# Patient Record
Sex: FEMALE | Race: WHITE | NOT HISPANIC OR LATINO
[De-identification: names, ages, dates, MRNs, and addresses within clinical notes are randomized per-mention and may not be internally consistent; named-entity substitution may affect disease eponyms.]

---

## 2017-07-26 ENCOUNTER — APPOINTMENT (OUTPATIENT)
Dept: INTERNAL MEDICINE | Facility: CLINIC | Age: 67
End: 2017-07-26

## 2017-07-26 VITALS
BODY MASS INDEX: 19.75 KG/M2 | SYSTOLIC BLOOD PRESSURE: 127 MMHG | OXYGEN SATURATION: 99 % | RESPIRATION RATE: 12 BRPM | HEART RATE: 72 BPM | DIASTOLIC BLOOD PRESSURE: 60 MMHG | WEIGHT: 122.9 LBS | HEIGHT: 66 IN

## 2017-07-26 DIAGNOSIS — M06.9 RHEUMATOID ARTHRITIS, UNSPECIFIED: ICD-10-CM

## 2017-08-04 ENCOUNTER — APPOINTMENT (OUTPATIENT)
Dept: INTERNAL MEDICINE | Facility: CLINIC | Age: 67
End: 2017-08-04

## 2017-08-04 LAB
ANION GAP SERPL CALC-SCNC: 17 MMOL/L
BUN SERPL-MCNC: 10 MG/DL
CALCIUM SERPL-MCNC: 9.4 MG/DL
CHLORIDE SERPL-SCNC: 101 MMOL/L
CO2 SERPL-SCNC: 24 MMOL/L
CREAT SERPL-MCNC: 0.79 MG/DL
GLUCOSE SERPL-MCNC: 98 MG/DL
HBA1C MFR BLD HPLC: 5.3 %
POTASSIUM SERPL-SCNC: 4.6 MMOL/L
SODIUM SERPL-SCNC: 142 MMOL/L

## 2017-09-08 ENCOUNTER — APPOINTMENT (OUTPATIENT)
Dept: INTERNAL MEDICINE | Facility: CLINIC | Age: 67
End: 2017-09-08
Payer: MEDICARE

## 2017-09-08 VITALS
HEIGHT: 66 IN | TEMPERATURE: 97.8 F | OXYGEN SATURATION: 98 % | SYSTOLIC BLOOD PRESSURE: 123 MMHG | BODY MASS INDEX: 19.61 KG/M2 | DIASTOLIC BLOOD PRESSURE: 78 MMHG | WEIGHT: 122 LBS | HEART RATE: 76 BPM | RESPIRATION RATE: 14 BRPM

## 2017-09-08 DIAGNOSIS — J30.9 ALLERGIC RHINITIS, UNSPECIFIED: ICD-10-CM

## 2017-09-08 PROCEDURE — 99213 OFFICE O/P EST LOW 20 MIN: CPT

## 2017-09-09 ENCOUNTER — CLINICAL ADVICE (OUTPATIENT)
Age: 67
End: 2017-09-09

## 2017-09-16 ENCOUNTER — CHART COPY (OUTPATIENT)
Age: 67
End: 2017-09-16

## 2017-09-27 ENCOUNTER — APPOINTMENT (OUTPATIENT)
Dept: PULMONOLOGY | Facility: CLINIC | Age: 67
End: 2017-09-27
Payer: MEDICARE

## 2017-09-27 ENCOUNTER — FORM ENCOUNTER (OUTPATIENT)
Age: 67
End: 2017-09-27

## 2017-09-27 VITALS
BODY MASS INDEX: 18.96 KG/M2 | OXYGEN SATURATION: 97 % | WEIGHT: 118 LBS | HEART RATE: 72 BPM | HEIGHT: 66 IN | SYSTOLIC BLOOD PRESSURE: 110 MMHG | DIASTOLIC BLOOD PRESSURE: 70 MMHG

## 2017-09-27 PROCEDURE — 99204 OFFICE O/P NEW MOD 45 MIN: CPT

## 2017-09-28 ENCOUNTER — OUTPATIENT (OUTPATIENT)
Dept: OUTPATIENT SERVICES | Facility: HOSPITAL | Age: 67
LOS: 1 days | End: 2017-09-28
Payer: MEDICARE

## 2017-09-28 ENCOUNTER — APPOINTMENT (OUTPATIENT)
Dept: RADIOLOGY | Facility: HOSPITAL | Age: 67
End: 2017-09-28
Payer: MEDICARE

## 2017-09-28 PROCEDURE — 71020: CPT | Mod: 26

## 2017-09-28 PROCEDURE — 71046 X-RAY EXAM CHEST 2 VIEWS: CPT

## 2017-10-06 ENCOUNTER — RX RENEWAL (OUTPATIENT)
Age: 67
End: 2017-10-06

## 2017-10-22 ENCOUNTER — FORM ENCOUNTER (OUTPATIENT)
Age: 67
End: 2017-10-22

## 2017-10-23 ENCOUNTER — OUTPATIENT (OUTPATIENT)
Dept: OUTPATIENT SERVICES | Facility: HOSPITAL | Age: 67
LOS: 1 days | End: 2017-10-23
Payer: MEDICARE

## 2017-10-23 ENCOUNTER — APPOINTMENT (OUTPATIENT)
Dept: RADIOLOGY | Facility: HOSPITAL | Age: 67
End: 2017-10-23
Payer: MEDICARE

## 2017-10-23 PROCEDURE — 71020: CPT | Mod: 26

## 2017-10-23 PROCEDURE — 71046 X-RAY EXAM CHEST 2 VIEWS: CPT

## 2018-07-09 ENCOUNTER — APPOINTMENT (OUTPATIENT)
Dept: INTERNAL MEDICINE | Facility: CLINIC | Age: 68
End: 2018-07-09
Payer: MEDICARE

## 2018-07-09 ENCOUNTER — NON-APPOINTMENT (OUTPATIENT)
Age: 68
End: 2018-07-09

## 2018-07-09 VITALS
HEART RATE: 68 BPM | HEIGHT: 65.5 IN | SYSTOLIC BLOOD PRESSURE: 155 MMHG | DIASTOLIC BLOOD PRESSURE: 67 MMHG | TEMPERATURE: 97.7 F | WEIGHT: 120 LBS | BODY MASS INDEX: 19.75 KG/M2 | RESPIRATION RATE: 12 BRPM

## 2018-07-09 VITALS — SYSTOLIC BLOOD PRESSURE: 129 MMHG | DIASTOLIC BLOOD PRESSURE: 67 MMHG

## 2018-07-09 DIAGNOSIS — H26.9 UNSPECIFIED CATARACT: ICD-10-CM

## 2018-07-09 DIAGNOSIS — Z87.898 PERSONAL HISTORY OF OTHER SPECIFIED CONDITIONS: ICD-10-CM

## 2018-07-09 DIAGNOSIS — R10.30 LOWER ABDOMINAL PAIN, UNSPECIFIED: ICD-10-CM

## 2018-07-09 PROCEDURE — 93000 ELECTROCARDIOGRAM COMPLETE: CPT

## 2018-07-09 PROCEDURE — 99214 OFFICE O/P EST MOD 30 MIN: CPT | Mod: 25

## 2018-07-09 NOTE — ASSESSMENT
[ECG] : ECG [Continue anticoagulant treatment as is] : Continue current anticoagulant treatment [Continue anti-platelet treatment as is] : Continue current anti-platelet treatment [Continue medications as is] : Continue current medications [FreeTextEntry4] : Low risk or low risk procedure.

## 2018-07-09 NOTE — CONSULT LETTER
[Consult Letter:] : I had the pleasure of evaluating your patient, [unfilled]. [Please see my note below.] : Please see my note below. [Consult Closing:] : Thank you very much for allowing me to participate in the care of this patient.  If you have any questions, please do not hesitate to contact me. [Sincerely,] : Sincerely,

## 2018-07-09 NOTE — HISTORY OF PRESENT ILLNESS
[Coronary Artery Disease] : no coronary artery disease [Diabetes] : no diabetes [Sleep Apnea] : no sleep apnea [COPD] : no COPD [Previous Adverse Anesthesia Reaction] : no previous adverse anesthesia reaction [FreeTextEntry1] : Left eye Cataract Surgery [FreeTextEntry2] : 7-19-18 [FreeTextEntry3] : Dr. Hooper

## 2018-12-03 ENCOUNTER — OUTPATIENT (OUTPATIENT)
Dept: OUTPATIENT SERVICES | Facility: HOSPITAL | Age: 68
LOS: 1 days | End: 2018-12-03
Payer: MEDICARE

## 2018-12-03 ENCOUNTER — APPOINTMENT (OUTPATIENT)
Dept: RADIOLOGY | Facility: HOSPITAL | Age: 68
End: 2018-12-03
Payer: MEDICARE

## 2018-12-03 ENCOUNTER — APPOINTMENT (OUTPATIENT)
Dept: INTERNAL MEDICINE | Facility: CLINIC | Age: 68
End: 2018-12-03
Payer: MEDICARE

## 2018-12-03 VITALS
OXYGEN SATURATION: 98 % | HEIGHT: 65.5 IN | BODY MASS INDEX: 20.45 KG/M2 | DIASTOLIC BLOOD PRESSURE: 86 MMHG | HEART RATE: 71 BPM | SYSTOLIC BLOOD PRESSURE: 139 MMHG | RESPIRATION RATE: 12 BRPM | WEIGHT: 124.2 LBS

## 2018-12-03 DIAGNOSIS — Z00.8 ENCOUNTER FOR OTHER GENERAL EXAMINATION: ICD-10-CM

## 2018-12-03 LAB — GLUCOSE BLDC GLUCOMTR-MCNC: 91

## 2018-12-03 PROCEDURE — 99214 OFFICE O/P EST MOD 30 MIN: CPT | Mod: 25

## 2018-12-03 PROCEDURE — 77080 DXA BONE DENSITY AXIAL: CPT | Mod: 26

## 2018-12-03 PROCEDURE — 82962 GLUCOSE BLOOD TEST: CPT

## 2018-12-03 PROCEDURE — 77080 DXA BONE DENSITY AXIAL: CPT

## 2018-12-03 RX ORDER — FLUTICASONE PROPIONATE 50 UG/1
50 SPRAY, METERED NASAL
Qty: 16 | Refills: 3 | Status: DISCONTINUED | COMMUNITY
Start: 2017-09-08 | End: 2018-12-03

## 2018-12-03 NOTE — HISTORY OF PRESENT ILLNESS
[FreeTextEntry8] : Pt. presents with complaints of intermittent numbness and tingling in her left hand. She has been taking prednisone for her arthritis and is concerned about diabetes. 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

## 2018-12-03 NOTE — PHYSICAL EXAM
[Normal Rhythm/Effort] : normal respiratory rhythm and effort [Clear Bilaterally] : the lungs were clear to auscultation bilaterally [Normal to Percussion] : the lungs were normal to percussion [Normal Rate] : normal [Normal S1] : normal S1 [Normal S2] : normal S2 [S3] : no S3 [S4] : no S4 [No Murmur] : no murmurs heard [No Pitting Edema] : no pitting edema present [Right Carotid Bruit] : no bruit heard over the right carotid [Left Carotid Bruit] : no bruit heard over the left carotid [Right Femoral Bruit] : no bruit heard over the right femoral artery [Left Femoral Bruit] : no bruit heard over the left femoral artery [2+] : left 2+ [No Abnormalities] : the abdominal aorta was not enlarged and no bruit was heard [Soft, Nontender] : the abdomen was soft and nontender [No Mass] : no masses were palpated [No HSM] : no hepatosplenomegaly noted [Normal Station and Gait] : the gait and station were normal [Normal] : motor strength was normal in all muscle groups [Normal Motor Tone] : the muscle tone was normal [Involuntary Movements] : no involuntary movements were seen

## 2018-12-03 NOTE — PLAN
[FreeTextEntry1] : Non-fasting blood glucose is 81.\par Discussed need to reduce triggers ie: chocolate.\par Wrist guard while typing.\par Pt. declines influenza vaccination at this time.

## 2019-05-03 ENCOUNTER — APPOINTMENT (OUTPATIENT)
Dept: PSYCHIATRY | Facility: CLINIC | Age: 69
End: 2019-05-03

## 2019-09-10 ENCOUNTER — OTHER (OUTPATIENT)
Age: 69
End: 2019-09-10

## 2019-09-11 LAB
ANION GAP SERPL CALC-SCNC: 11 MMOL/L
BUN SERPL-MCNC: 11 MG/DL
CALCIUM SERPL-MCNC: 9.4 MG/DL
CHLORIDE SERPL-SCNC: 104 MMOL/L
CO2 SERPL-SCNC: 26 MMOL/L
CREAT SERPL-MCNC: 0.69 MG/DL
GLUCOSE SERPL-MCNC: 81 MG/DL
POTASSIUM SERPL-SCNC: 4 MMOL/L
SODIUM SERPL-SCNC: 141 MMOL/L

## 2019-09-16 LAB
25(OH)D3 SERPL-MCNC: 25.3 NG/ML
BASOPHILS # BLD AUTO: 0.06 K/UL
BASOPHILS NFR BLD AUTO: 0.7 %
EOSINOPHIL # BLD AUTO: 0.1 K/UL
EOSINOPHIL NFR BLD AUTO: 1.1 %
ESTIMATED AVERAGE GLUCOSE: 111 MG/DL
HBA1C MFR BLD HPLC: 5.5 %
HCT VFR BLD CALC: 42.1 %
HGB BLD-MCNC: 13.1 G/DL
IMM GRANULOCYTES NFR BLD AUTO: 0.2 %
LYMPHOCYTES # BLD AUTO: 1.5 K/UL
LYMPHOCYTES NFR BLD AUTO: 16.9 %
MAN DIFF?: NORMAL
MCHC RBC-ENTMCNC: 31.1 GM/DL
MCHC RBC-ENTMCNC: 31.3 PG
MCV RBC AUTO: 100.7 FL
MONOCYTES # BLD AUTO: 0.76 K/UL
MONOCYTES NFR BLD AUTO: 8.6 %
NEUTROPHILS # BLD AUTO: 6.42 K/UL
NEUTROPHILS NFR BLD AUTO: 72.5 %
PLATELET # BLD AUTO: 298 K/UL
RBC # BLD: 4.18 M/UL
RBC # FLD: 12.4 %
VIT B12 SERPL-MCNC: 483 PG/ML
WBC # FLD AUTO: 8.86 K/UL

## 2019-09-18 ENCOUNTER — APPOINTMENT (OUTPATIENT)
Dept: INTERNAL MEDICINE | Facility: CLINIC | Age: 69
End: 2019-09-18

## 2020-01-03 ENCOUNTER — APPOINTMENT (OUTPATIENT)
Dept: INTERNAL MEDICINE | Facility: CLINIC | Age: 70
End: 2020-01-03
Payer: MEDICARE

## 2020-01-03 PROCEDURE — 90471 IMMUNIZATION ADMIN: CPT

## 2020-01-03 PROCEDURE — 90715 TDAP VACCINE 7 YRS/> IM: CPT | Mod: GY

## 2020-03-12 ENCOUNTER — APPOINTMENT (OUTPATIENT)
Dept: CARDIOLOGY | Facility: CLINIC | Age: 70
End: 2020-03-12

## 2020-04-01 ENCOUNTER — APPOINTMENT (OUTPATIENT)
Dept: FAMILY MEDICINE | Facility: CLINIC | Age: 70
End: 2020-04-01
Payer: MEDICARE

## 2020-04-01 PROCEDURE — 99442: CPT

## 2020-04-01 PROCEDURE — G2012 BRIEF CHECK IN BY MD/QHP: CPT

## 2020-04-12 ENCOUNTER — NON-APPOINTMENT (OUTPATIENT)
Age: 70
End: 2020-04-12

## 2020-04-24 ENCOUNTER — APPOINTMENT (OUTPATIENT)
Dept: FAMILY MEDICINE | Facility: CLINIC | Age: 70
End: 2020-04-24
Payer: MEDICARE

## 2020-04-24 PROCEDURE — 99442: CPT | Mod: CS,CR

## 2020-04-24 NOTE — HISTORY OF PRESENT ILLNESS
[Verbal consent obtained from patient] : the patient, [unfilled] [FreeTextEntry1] : She called in followup October 19 situation she has had symptoms since April 1 which have been generally resolved although she she has been left with occasional sore throat and also a headache which is less than it had been but still prominent she's been taking up to 2 g of Tylenol a day but no nausea no vomiting no localizing neurological symptoms she had been on high dose steroids for many months and stopped them only shortly before her symptoms appeared we have tried to reassure her and we will have her call us back next week for progress report that we have certainly seen people with headaches from this that lasted up to 3 or 4 weeks followup next week consent is obtained by staff and confirmed [Time Spent: ___ minutes] : I have spent [unfilled] minutes with the patient on the telephone

## 2020-05-19 ENCOUNTER — APPOINTMENT (OUTPATIENT)
Dept: FAMILY MEDICINE | Facility: CLINIC | Age: 70
End: 2020-05-19

## 2020-05-19 ENCOUNTER — APPOINTMENT (OUTPATIENT)
Dept: FAMILY MEDICINE | Facility: CLINIC | Age: 70
End: 2020-05-19
Payer: MEDICARE

## 2020-05-19 PROCEDURE — 99442: CPT | Mod: CS,95

## 2020-05-19 NOTE — HISTORY OF PRESENT ILLNESS
[FreeTextEntry1] : Conset obtained . Pt was symptom free for several weeks and returned to her usual state of health only to have headaches return 2 days ago along with smelling an unidentifiable smell. no other symptoms. heaache improved on tylenol. Will go for COVID antibody test today. If +  probably continue symptomatic management. If negative  work up headache [Verbal consent obtained from patient] : the patient, [unfilled] [Time Spent: ___ minutes] : I have spent [unfilled] minutes with the patient on the telephone

## 2020-06-22 ENCOUNTER — APPOINTMENT (OUTPATIENT)
Dept: INTERNAL MEDICINE | Facility: CLINIC | Age: 70
End: 2020-06-22

## 2020-07-22 ENCOUNTER — APPOINTMENT (OUTPATIENT)
Dept: FAMILY MEDICINE | Facility: CLINIC | Age: 70
End: 2020-07-22

## 2020-08-05 ENCOUNTER — APPOINTMENT (OUTPATIENT)
Dept: FAMILY MEDICINE | Facility: CLINIC | Age: 70
End: 2020-08-05

## 2020-08-10 ENCOUNTER — APPOINTMENT (OUTPATIENT)
Dept: FAMILY MEDICINE | Facility: CLINIC | Age: 70
End: 2020-08-10

## 2020-09-13 LAB
SARS-COV-2 IGG SERPL IA-ACNC: <0.1 INDEX
SARS-COV-2 IGG SERPL QL IA: NEGATIVE
SARS-COV-2 N GENE NPH QL NAA+PROBE: NOT DETECTED

## 2021-01-01 ENCOUNTER — TRANSCRIPTION ENCOUNTER (OUTPATIENT)
Age: 71
End: 2021-01-01

## 2021-01-04 ENCOUNTER — APPOINTMENT (OUTPATIENT)
Dept: FAMILY MEDICINE | Facility: CLINIC | Age: 71
End: 2021-01-04

## 2021-04-02 ENCOUNTER — TRANSCRIPTION ENCOUNTER (OUTPATIENT)
Age: 71
End: 2021-04-02

## 2021-04-03 ENCOUNTER — TRANSCRIPTION ENCOUNTER (OUTPATIENT)
Age: 71
End: 2021-04-03

## 2021-04-04 ENCOUNTER — TRANSCRIPTION ENCOUNTER (OUTPATIENT)
Age: 71
End: 2021-04-04

## 2021-04-05 ENCOUNTER — TRANSCRIPTION ENCOUNTER (OUTPATIENT)
Age: 71
End: 2021-04-05

## 2021-04-06 ENCOUNTER — TRANSCRIPTION ENCOUNTER (OUTPATIENT)
Age: 71
End: 2021-04-06

## 2021-04-07 DIAGNOSIS — U07.1 COVID-19: ICD-10-CM

## 2021-04-08 LAB — SARS-COV-2 N GENE NPH QL NAA+PROBE: NOT DETECTED

## 2021-04-10 ENCOUNTER — TRANSCRIPTION ENCOUNTER (OUTPATIENT)
Age: 71
End: 2021-04-10

## 2021-04-10 ENCOUNTER — NON-APPOINTMENT (OUTPATIENT)
Age: 71
End: 2021-04-10

## 2021-04-15 DIAGNOSIS — G47.00 INSOMNIA, UNSPECIFIED: ICD-10-CM

## 2021-04-15 DIAGNOSIS — M19.041 PRIMARY OSTEOARTHRITIS, RIGHT HAND: ICD-10-CM

## 2021-04-15 DIAGNOSIS — F41.9 ANXIETY DISORDER, UNSPECIFIED: ICD-10-CM

## 2021-04-15 DIAGNOSIS — M19.042 PRIMARY OSTEOARTHRITIS, RIGHT HAND: ICD-10-CM

## 2021-04-15 LAB — SARS-COV-2 N GENE NPH QL NAA+PROBE: NOT DETECTED

## 2021-04-16 ENCOUNTER — APPOINTMENT (OUTPATIENT)
Dept: INTERNAL MEDICINE | Facility: CLINIC | Age: 71
End: 2021-04-16

## 2021-04-16 LAB
25(OH)D3 SERPL-MCNC: 27.2 NG/ML
ALBUMIN SERPL ELPH-MCNC: 4.4 G/DL
ALP BLD-CCNC: 105 U/L
ALT SERPL-CCNC: 24 U/L
ANION GAP SERPL CALC-SCNC: 9 MMOL/L
AST SERPL-CCNC: 30 U/L
BASOPHILS # BLD AUTO: 0.07 K/UL
BASOPHILS NFR BLD AUTO: 0.8 %
BILIRUB SERPL-MCNC: 0.4 MG/DL
BUN SERPL-MCNC: 16 MG/DL
CALCIUM SERPL-MCNC: 9.9 MG/DL
CHLORIDE SERPL-SCNC: 103 MMOL/L
CHOLEST SERPL-MCNC: 209 MG/DL
CO2 SERPL-SCNC: 28 MMOL/L
CREAT SERPL-MCNC: 0.75 MG/DL
EOSINOPHIL # BLD AUTO: 0.25 K/UL
EOSINOPHIL NFR BLD AUTO: 2.7 %
GLUCOSE SERPL-MCNC: 82 MG/DL
HCT VFR BLD CALC: 42.5 %
HDLC SERPL-MCNC: 74 MG/DL
HGB BLD-MCNC: 13.4 G/DL
IMM GRANULOCYTES NFR BLD AUTO: 0.3 %
LDLC SERPL CALC-MCNC: 85 MG/DL
LYMPHOCYTES # BLD AUTO: 1.77 K/UL
LYMPHOCYTES NFR BLD AUTO: 19 %
MAN DIFF?: NORMAL
MCHC RBC-ENTMCNC: 31.5 GM/DL
MCHC RBC-ENTMCNC: 31.5 PG
MCV RBC AUTO: 100 FL
MONOCYTES # BLD AUTO: 0.9 K/UL
MONOCYTES NFR BLD AUTO: 9.6 %
NEUTROPHILS # BLD AUTO: 6.31 K/UL
NEUTROPHILS NFR BLD AUTO: 67.6 %
NONHDLC SERPL-MCNC: 136 MG/DL
PLATELET # BLD AUTO: 331 K/UL
POTASSIUM SERPL-SCNC: 5.1 MMOL/L
PROT SERPL-MCNC: 6.7 G/DL
RBC # BLD: 4.25 M/UL
RBC # FLD: 12.9 %
SODIUM SERPL-SCNC: 140 MMOL/L
TRIGL SERPL-MCNC: 251 MG/DL
TSH SERPL-ACNC: 1.99 UIU/ML
WBC # FLD AUTO: 9.33 K/UL

## 2021-04-19 LAB
ESTIMATED AVERAGE GLUCOSE: 105 MG/DL
HBA1C MFR BLD HPLC: 5.3 %

## 2021-04-26 LAB — T4 FREE SERPL DIALY-MCNC: 0.99 NG/DL

## 2021-06-07 ENCOUNTER — APPOINTMENT (OUTPATIENT)
Dept: INTERNAL MEDICINE | Facility: CLINIC | Age: 71
End: 2021-06-07

## 2021-06-10 LAB
M TB IFN-G BLD-IMP: NEGATIVE
QUANTIFERON TB PLUS MITOGEN MINUS NIL: 4.06 IU/ML
QUANTIFERON TB PLUS NIL: 0.01 IU/ML
QUANTIFERON TB PLUS TB1 MINUS NIL: 0 IU/ML
QUANTIFERON TB PLUS TB2 MINUS NIL: 0 IU/ML

## 2022-07-06 ENCOUNTER — APPOINTMENT (OUTPATIENT)
Dept: NEUROLOGY | Facility: CLINIC | Age: 72
End: 2022-07-06

## 2022-07-06 VITALS
HEART RATE: 54 BPM | SYSTOLIC BLOOD PRESSURE: 120 MMHG | WEIGHT: 114 LBS | OXYGEN SATURATION: 97 % | DIASTOLIC BLOOD PRESSURE: 73 MMHG | TEMPERATURE: 97.8 F | HEIGHT: 65.5 IN | BODY MASS INDEX: 18.77 KG/M2

## 2022-07-06 DIAGNOSIS — G62.9 POLYNEUROPATHY, UNSPECIFIED: ICD-10-CM

## 2022-07-06 DIAGNOSIS — M81.0 AGE-RELATED OSTEOPOROSIS W/OUT CURRENT PATHOLOGICAL FRACTURE: ICD-10-CM

## 2022-07-06 DIAGNOSIS — M54.50 LOW BACK PAIN, UNSPECIFIED: ICD-10-CM

## 2022-07-06 DIAGNOSIS — E55.9 VITAMIN D DEFICIENCY, UNSPECIFIED: ICD-10-CM

## 2022-07-06 PROCEDURE — 99205 OFFICE O/P NEW HI 60 MIN: CPT

## 2022-07-06 RX ORDER — METOPROLOL SUCCINATE 25 MG/1
25 TABLET, EXTENDED RELEASE ORAL
Qty: 30 | Refills: 0 | Status: ACTIVE | COMMUNITY
Start: 2022-06-28

## 2022-07-06 NOTE — HISTORY OF PRESENT ILLNESS
[FreeTextEntry1] : This is a 70 y/o woman who is being seen in neurologic consultation for evaluation of peripheral neuropathy. [place] [facility] care.\par \par Diagnosed by neuropathy (Dr. Avitia in [place])- 15 years ago ?\par EMG/NCV was done - \par Numbness in feet, occasionally tingling, no severe neuropathic pain\par Gabapentin 200 mg three times a day (when tries to wean- symptoms get worse)\par Balance is okay - no fallen - intermittently can be an issue.\par \par She has a history of chronic Low back - its crippling and severe- travels down both legs and then settles into knees - ? she isn't sure if back pain and knee pain are related.\par Voltaren gel topically\par No loss of strength\par but walking and exercise is difficult.\par Had MRI lumbar spine done in 2021- has disc with her- \par \par Patient has severe osteoporosis - leading to diffuse arthralgias- advised to take Fosamax but never did based on her own research.  Used to f/u with Dr. Norma Beyer and now saw Dr. Aaron from [facility] rheumatology. At one point was told she had RA but in fact does not.\par Saw Dr. Aaron a rheumatologist - [facility] - recommended NSAID and Lyrica or Cymbalta (as had Dr. Beyer)- patient never took bc of her concerns about side effects.\par Had  trigger point injection from pain management in [place]- which helps pain.\par \par Arthritic pain diffusely\par \par \par \par

## 2022-07-06 NOTE — ASSESSMENT
[FreeTextEntry1] : For neuropathy- labs as outlined below.\par Records from Dr. Avitia. \par No need to repeat EMG at this time.\par Continue Gabapentin 200 mg three times a day\par \par For severe arthritis and osteoporosis - F/u with Dr. Perea - Is Fosamax an option? \par \par For low back pain, refer to pain management- they can do trigger points and MARIAH as indicated. She is very resistant to taking more medication.\par Physical therapy.\par \par f/u in one year routinely.\par \par \par

## 2022-07-06 NOTE — PHYSICAL EXAM
[FreeTextEntry1] : Physical examination \par General: No acute distress, Awake, Alert.  Seems very anxious\par \par Mental status \par Awake, alert, and oriented to person, time and place, Normal attention span and concentration, Recent and remote memory intact, Language intact, Fund of knowledge intact. \par Cranial Nerves \par II: VFF \par III, IV, VI: PERRL, EOMI. \par V: Facial sensation is normal B/L. \par VII: Facial strength is normal B/L. \par VIII: Gross hearing is intact. \par IX, X: Palate is midline and elevates symmetrically. \par XI: Trapezius normal strength. \par XII: Tongue midline without atrophy or fasciculations. \par \par Motor exam \par Muscle tone - no evidence of rigidity or resistance in all 4 extremities. \par No atrophy or fasciculations \par Muscle Strength: arms and legs, proximal and distal flexors and extensors are normal \par No UE drift.\par \par Reflexes \par All present, normal, and symmetrical. \par Plantars right: mute. \par Plantars left: mute. \par Absent ankle jerks. \par \par Coordination \par Finger to nose: Normal. \par Heel to shin: Normal. \par \par Sensory \par Intact sensation to cold; absent vibration. Romberg present. \par \par Gait \par cautious

## 2022-07-06 NOTE — CONSULT LETTER
[Dear  ___] : Dear  [unfilled], [Consult Letter:] : I had the pleasure of evaluating your patient, [unfilled]. [Please see my note below.] : Please see my note below. [FreeTextEntry3] : Sincerely,\par \par Peter Guevara M.D.\par

## 2022-07-07 DIAGNOSIS — E53.8 DEFICIENCY OF OTHER SPECIFIED B GROUP VITAMINS: ICD-10-CM

## 2022-07-07 LAB
25(OH)D3 SERPL-MCNC: 23.3 NG/ML
FOLATE SERPL-MCNC: 4.2 NG/ML
VIT B12 SERPL-MCNC: 325 PG/ML

## 2022-07-13 ENCOUNTER — FORM ENCOUNTER (OUTPATIENT)
Age: 72
End: 2022-07-13

## 2022-07-18 LAB
VIT B1 SERPL-MCNC: 127 NMOL/L
VIT B6 SERPL-MCNC: 5.9 UG/L

## 2022-08-29 ENCOUNTER — APPOINTMENT (OUTPATIENT)
Dept: PAIN MANAGEMENT | Facility: CLINIC | Age: 72
End: 2022-08-29

## 2022-08-29 VITALS
HEART RATE: 62 BPM | WEIGHT: 112 LBS | HEIGHT: 65 IN | DIASTOLIC BLOOD PRESSURE: 60 MMHG | SYSTOLIC BLOOD PRESSURE: 112 MMHG | RESPIRATION RATE: 12 BRPM | OXYGEN SATURATION: 98 % | BODY MASS INDEX: 18.66 KG/M2

## 2022-08-29 PROCEDURE — 99204 OFFICE O/P NEW MOD 45 MIN: CPT

## 2022-08-29 NOTE — REVIEW OF SYSTEMS
[Back Pain] : back pain [Neck Pain] : neck pain [Radiating Pain] : radiating pain [Decreased ROM] : decreased range of motion [Weakness] : weakness [Negative] : Heme/Lymph

## 2022-08-30 RX ORDER — TIZANIDINE 2 MG/1
2 TABLET ORAL
Qty: 30 | Refills: 0 | Status: ACTIVE | COMMUNITY
Start: 2022-08-30 | End: 1900-01-01

## 2022-08-30 RX ORDER — METHOCARBAMOL 500 MG/1
500 TABLET, FILM COATED ORAL
Qty: 25 | Refills: 1 | Status: DISCONTINUED | COMMUNITY
Start: 2022-08-29 | End: 2022-08-30

## 2022-08-30 NOTE — PHYSICAL EXAM
[de-identified] : General: Well-developed and well-nourished.  No acute distress.\par Psychiatric: Pressured speech, anxious, cooperative\par Head:  Normocephalic and atraumatic\par Eyes:  Sclera white. Conjunctiva and eyelids pink and moist without discharge.\par Cardiovascular:  Regular\par Respiratory:  Trachea midline. Normal effort.\par No accessory muscle use with respiration\par Abdomen: Non distended, soft and nontender\par Skin:  No rashes, ulcers, or lesions appreciated.\par Neck: Neck pain with extension,   restricted ROM \par Extremities: No edema \par Musculoskeletal: Moving all extremities freely \par Neuro: CN 2-12 Grossly intact\par Sensation to light touch is intact extremities\par Gait: Ambulates with no assistive device.

## 2022-08-30 NOTE — ASSESSMENT
[FreeTextEntry1] : Ms. FRANDY DE GUZMAN is a 71 year F suffering from neck left shoulder pain, that based upon today's subjective complaints, physical examination, and MRI review, is likely secondary to cervical stenosis\par \par >> Medications\par \par Chronic opioid use for non-malignant pain, in particular at high doses would not be recommended since it can potentially lead to hyperalgesia (hypersensitivity), tolerance and addiction. \par  \par Cw Gabapentin 300 mg BID\par \par Robaxin 500 mg po qhs prn spasm\par \par >> Interventions\par  \par Arrange for LEFT C7/T1 interlaminar epidural steroid injection under fluoroscopic guidance. Success rate and possible complications discussed with patient in detail. ~5 cm\par \par Consider viscosupplementation of the knees\par  \par >> Therapy and Other Modalities\par \par diagnosis: rvical spondylosis\par physical therapy - 2x weekly / 6 weeks\par increase ROM, strengthening, postural training, other modalities ad moncho\par Precautions - universal safety, falls \par  \par Continue with daily home stretching regimen\par \par >> Imaging and Other Studies\par \par See Media \par \par >> Consults\par \par None ordered

## 2022-08-30 NOTE — HISTORY OF PRESENT ILLNESS
[Back Pain] : back pain [Neck Pain] : neck pain [___ mths] : [unfilled] month(s) ago [Constant] : constant [9] : a minimum pain level of 9/10 [10] : a maximum pain level of 10/10 [Sharp] : sharp [Dull] : dull [Aching] : aching [Throbbing] : throbbing [Burning] : burning [Shooting] : shooting [Electric] : electric [Transitioning] : transitioning [Lifting] : lifting [Looking Up] : looking up [Looking Down] : looking down [Heat] : heat [FreeTextEntry1] : HPI - Ms. FRANDY DE GUZMAN is a 71 year F with PHx, chronic pain, severe degenerative arthritis of her knees and hands as below, referred by Leanne Hidalgo MD who presents today with chief complaint of LEFT sided neck pain. Reports that it developed many years ago. It i/s located neck spine;  there is radiation of the pain  into the occiput and left shoulder The pain is presently 8/10  in severity on the numerical rating scale. It is sharp in nature. The pain is constant. There is diurnal worsening, during the night The pain is aggravated with activity The pain improves with rest. The pain is functionally and emotionally disabling for the patient as its preventing them from going about activities of daily living, such as routine housework. The pain does impair the patient’s ability to sleep. \par \par Patient has been seen by pain management in the past Dr Janelle Borjas\par Patient attests to 6 weeks of provider directed treatment, including gabapentin, Cymbalta, and Lyrica.\par Has trialed viscosupplementation and intra-articular steroid injections, physical therapy\par Patient reports treatment that occurred within the last 3 months\par Patient reports that symptoms have been persistent and and progressing after treatment\par  \par Reports there is no present numbness, there is no weakness. Patient denies any bowel/bladder incontinence, no saddle/perineal anesthesia or any other red flag signs or symptoms. Reports BMs.\par   [FreeTextEntry2] : 27 [FreeTextEntry7] : Referred by Dr. TANNER Perea.  Patient presents with back of neck pain radiating to skull. Lower back pain radiating to both knees. [FreeTextEntry3] : Driving [FreeTextEntry4] : Trigger points injections,

## 2022-08-30 NOTE — CONSULT LETTER
[Dear  ___] : Dear  [unfilled], [Consult Letter:] : I had the pleasure of evaluating your patient, [unfilled]. [Please see my note below.] : Please see my note below. [Consult Closing:] : Thank you very much for allowing me to participate in the care of this patient.  If you have any questions, please do not hesitate to contact me. [Sincerely,] : Sincerely, [FreeTextEntry3] : Nabeel Alfaro DO

## 2022-08-30 NOTE — DATA REVIEWED
[FreeTextEntry1] : MRI lumbar spine dated August 30, 2021\par Moderate levocurvature of the lumbar spine, no spondylolisthesis.  Severe dorsal paraspinal muscle atrophy multilevel disc desiccation and disc base narrowing moderate to severe at L4/5.\par L1-2 and minimal disc bulge and mild bilateral facet arthrosis.  No canal stenosis or foraminal narrowing.\par L2-3 small disc bulge with tiny left foraminal annular fissure.  Mild bilateral facet arthrosis.  Mild right foraminal narrowing.  No left foraminal narrowing or stenosis in the central canal.  L3-4 moderate disc bulge eccentric to the right mild bilateral facet arthrosis moderate right foraminal narrowing and moderate right lateral recess stenosis.  No left foraminal narrowing or canal stenosis\par L4-5 moderate disc bulge and mild to moderate bilateral facet arthrosis mild to moderate right and mild left foraminal narrowing.  Mild canal stenosis.  L5-S1 moderate disc bulge eccentric to the left mild to moderate bilateral facet arthrosis mild to moderate left\par \par MRI cervical spine August 30, 2021 C2-C3 fusion of bilateral facet joints.  No disc herniations canal stenosis foraminal stenosis.  C3-4 disc ridge complex and ligamentum flavum hypertrophy mildly contact the dorsal aspect of the spinal cord and cause mild canal stenosis without cord signal abnormality.  Left greater than right uncovertebral hypertrophy and facet arthrosis causes moderate to severe left foraminal narrowing.  No right foraminal narrowing.  C4-5 disc ridge complex indents the ventral thecal sac without contacting the spinal cord no canal stenosis bilateral uncovertebral hypertrophy and facet arthrosis cause mild right and moderate left foraminal narrowing C5-6 disc ridge complex mildly contact the ventral spinal cord without mild canal stenosis or cord signal abnormality left greater than right uncovertebral hypertrophy and facet arthrosis causes mild right and moderate left foraminal narrowing C6-7 disc ridge complex indents the ventral thecal sac without contacting the spinal cord.  No canal stenosis.  Left greater than right uncovertebral hypertrophy and facet arthrosis cause mild right and mild to moderate left foraminal narrowing C7-T1 disc ridge complex with tiny central disc extrusion with superior migration indents the thecal sac ventrally without contacting spinal cord.  No canal stenosis.  Left greater than right uncovertebral hypertrophy and facet arthrosis cause mild left foraminal narrowing no right foraminal narrowing\par \par 5cm at c7/t1\par \par \par \par MRI thoracic spine moderate to severe dextrocurvature of the lumbar spine mild multilevel disc desiccation without disc herniation, canal stenosis, or foraminal narrowing.\par \par MRI right knee September 1, 2021 joint effusion and popliteal cyst tricompartmental osteoarthritis, partially deficiency of the medial and lateral menisci compatible with degenerative tears versus a sequela of prior intervention deficiency of the ACL suspicious for chronic tear\par MRI left knee September 1, 2021 osteoarthritis, medial meniscal tear\par \par X-ray hand, severe degenerative arthritis of the right middle finger, proximal interphalangeal joint and basilar joints of the thumb

## 2022-09-08 ENCOUNTER — RESULT REVIEW (OUTPATIENT)
Age: 72
End: 2022-09-08

## 2022-09-09 ENCOUNTER — APPOINTMENT (OUTPATIENT)
Dept: PAIN MANAGEMENT | Facility: CLINIC | Age: 72
End: 2022-09-09

## 2022-09-09 VITALS
HEIGHT: 65 IN | RESPIRATION RATE: 14 BRPM | HEART RATE: 68 BPM | SYSTOLIC BLOOD PRESSURE: 128 MMHG | DIASTOLIC BLOOD PRESSURE: 82 MMHG | OXYGEN SATURATION: 98 % | WEIGHT: 112 LBS | BODY MASS INDEX: 18.66 KG/M2

## 2022-09-09 PROCEDURE — 20553 NJX 1/MLT TRIGGER POINTS 3/>: CPT

## 2022-09-09 PROCEDURE — 99214 OFFICE O/P EST MOD 30 MIN: CPT | Mod: 25

## 2022-09-09 NOTE — ASSESSMENT
[FreeTextEntry1] : Ms. FRANDY DE GUZMAN is a 71 year F suffering from neck left shoulder pain, that based upon today's subjective complaints, physical examination, and MRI review, is likely secondary to cervical stenosis\par \par >> Medications\par \par Chronic opioid use for non-malignant pain, in particular at high doses would not be recommended since it can potentially lead to hyperalgesia (hypersensitivity), tolerance and addiction. \par  \par Cw Gabapentin 300 mg BID\par \par Robaxin 500 mg po qhs prn spasm\par \par \par >> Interventions\par  \par Arrange for LEFT C7/T1 interlaminar epidural steroid injection under fluoroscopic guidance. Success rate and possible complications discussed with patient in detail. ~5 cm\par \par Trigger point injections bilateral cervical parspainsl muscles. See procedure note below\par \par Consider viscosupplementation of the knees\par  \par >> Therapy and Other Modalities\par \par diagnosis: rvical spondylosis\par physical therapy - 2x weekly / 6 weeks\par increase ROM, strengthening, postural training, other modalities ad moncho\par Precautions - universal safety, falls \par  \par Continue with daily home stretching regimen\par \par >> Imaging and Other Studies\par \par See Media \par \par >> Consults\par \par None ordered \par \par ////\par \par PROCEDURE NOTE\par \par PATIENT NAME: Brie MCCORMACK  \par \par DATE OF BIRTH: []\par \par ATTENDING:  Nabeel Alfaro DO\par \par ASSISTANT:  None\par \par PREPROCEDURE DIAGNOSIS:   Myalgia\par \par POSTPROCEDURE DIAGNOSIS: Myalgian\par \par PROCEDURE PERFORMED:  Trigger Point Injections - Number of Muscle Groups Bilateral Cervical Trigger Point Injections \par \par CONTRAINDICATIONS: None\par \par PROCEDURE IN DETAIL:  I explained the details of the procedure to the patient including the risks, benefits and alternatives. We had an informed discussion and the patient verbalized understanding and signed the consent form. All questions were answered appropriately. \par \par The area with palpable trigger points was identified and included the  Bilateral Cervical Trigger Point Injections. A 25 G 1 in needle was used to enter multiple trigger points identified. After negative aspiration, each trigger point was injected with a mixture of 1ml of Kenalog 40 mg/mL 4ml 0.25% Bupivacaine mixed with 5ml of 1% Lidocaine. A total of 10 ml was used. \par \par COMPLICATIONS:  None\par \par BLOOD LOSS: None\par 								\par DISPOSITION:\par 1. Discharged home with instructions. \par 2. Follow up in the Pain [facility] in 2-4 weeks or on an as needed basis.

## 2022-09-09 NOTE — PHYSICAL EXAM
[de-identified] : General: Well-developed and well-nourished.  No acute distress.\par Psychiatric: Pressured speech, anxious, cooperative\par Head:  Normocephalic and atraumatic\par Eyes:  Sclera white. Conjunctiva and eyelids pink and moist without discharge.\par Cardiovascular:  Regular\par Respiratory:  Trachea midline. Normal effort.\par No accessory muscle use with respiration\par Abdomen: Non distended, soft and nontender\par Skin:  No rashes, ulcers, or lesions appreciated.\par Neck: Neck pain with extension,   restricted ROM \par Extremities: No edema \par Musculoskeletal: Moving all extremities freely \par Neuro: CN 2-12 Grossly intact\par Sensation to light touch is intact extremities\par Gait: Ambulates with no assistive device.

## 2022-09-09 NOTE — HISTORY OF PRESENT ILLNESS
[8] : a current pain level of 8/10 [Back Pain] : back pain [Neck Pain] : neck pain [FreeTextEntry1] : Interval Note:\par \par Since last visit the pain intensity has intensified most prominently in the neck bilaterally worse on the left.  She has been considering for epidural steroid injection however wishes to try trigger point injections which is approved very helpful in the past with her prior pain physician in [place].  Last trigger point injections were in June of this year.\par \par Moving bowels regularly. No recent falls. \par \par Denies weakness, numbness. Patient denies any bowel/bladder incontinence, no saddle/perineal anesthesia or any other red flag signs or symptoms. \par \par \par --\par \par HPI - Ms. FRANDY DE GUZMAN is a 71 year F with PHx, chronic pain, severe degenerative arthritis of her knees and hands as below, referred by Leanne Hidalgo MD who presents today with chief complaint of LEFT sided neck pain. Reports that it developed many years ago. It i/s located neck spine;  there is radiation of the pain  into the occiput and left shoulder The pain is presently 8/10  in severity on the numerical rating scale. It is sharp in nature. The pain is constant. There is diurnal worsening, during the night The pain is aggravated with activity The pain improves with rest. The pain is functionally and emotionally disabling for the patient as its preventing them from going about activities of daily living, such as routine housework. The pain does impair the patient’s ability to sleep. \par \par Patient has been seen by pain management in the past Dr Janelle Borjas\par Patient attests to 6 weeks of provider directed treatment, including gabapentin, Cymbalta, and Lyrica.\par Has trialed viscosupplementation and intra-articular steroid injections, physical therapy\par Patient reports treatment that occurred within the last 3 months\par Patient reports that symptoms have been persistent and and progressing after treatment\par  \par Reports there is no present numbness, there is no weakness. Patient denies any bowel/bladder incontinence, no saddle/perineal anesthesia or any other red flag signs or symptoms. Reports BMs.\par

## 2022-09-26 ENCOUNTER — APPOINTMENT (OUTPATIENT)
Dept: PAIN MANAGEMENT | Facility: CLINIC | Age: 72
End: 2022-09-26

## 2022-09-26 DIAGNOSIS — M25.50 PAIN IN UNSPECIFIED JOINT: ICD-10-CM

## 2022-09-26 PROCEDURE — 99214 OFFICE O/P EST MOD 30 MIN: CPT | Mod: 95

## 2022-09-26 NOTE — ASSESSMENT
[FreeTextEntry1] : Ms. FRANDY DE GUZMAN is a 71 year F suffering from neck left shoulder pain, that based upon today's subjective complaints, physical examination, and MRI review, is likely secondary to cervical stenosis\par \par >> Medications\par \par Chronic opioid use for non-malignant pain, in particular at high doses would not be recommended since it can potentially lead to hyperalgesia (hypersensitivity), tolerance and addiction. \par  \par Cw Gabapentin 300 mg BID\par \par Robaxin 500 mg po qhs prn spasm\par \par \par >> Interventions\par \par Viscosupplementation of bilateral knees (at [place])\par \par Consider trigger point injections of lumbar paraspinal muscles / quadratus musculature\par  \par Consider cervical interlaminar MARIAH at C7/T1  (needs a  bc wants conscious sedation)\par \par Trigger point injections bilateral cervical parspainsl muscles. See procedure note below\par \par \par  \par >> Therapy and Other Modalities\par   \par Continue with daily home stretching regimen\par \par >> Imaging and Other Studies\par \par See Media \par \par >> Consults\par \par None ordered \par

## 2022-09-26 NOTE — HISTORY OF PRESENT ILLNESS
[Home] : at home, [unfilled] , at the time of the visit. [Medical Office: ([facility])___] : at the medical office located in  [Verbal consent obtained from patient] : the patient, [unfilled] [FreeTextEntry1] : Interval Note:\par \par S/p bilateral cervical paraspinal TPI injection with excellent relief of neck pain\par \par Complaining of bilateral knee pain interested in injection\par \par Moving bowels regularly. No recent falls. \par \par Denies weakness, numbness. Patient denies any bowel/bladder incontinence, no saddle/perineal anesthesia or any other red flag signs or symptoms. \par \par --\par \par Trigger Point Injections - Number of Muscle Groups Bilateral Cervical Trigger Point Injections 09/09/22\par \par --\par \par HPI - Ms. FRANDY DE GUZMAN is a 71 year F with PHx, chronic pain, severe degenerative arthritis of her knees and hands as below, referred by Leanne Hidalgo MD who presents today with chief complaint of LEFT sided neck pain. Reports that it developed many years ago. It i/s located neck spine;  there is radiation of the pain  into the occiput and left shoulder The pain is presently 8/10  in severity on the numerical rating scale. It is sharp in nature. The pain is constant. There is diurnal worsening, during the night The pain is aggravated with activity The pain improves with rest. The pain is functionally and emotionally disabling for the patient as its preventing them from going about activities of daily living, such as routine housework. The pain does impair the patient’s ability to sleep. \par \par Patient has been seen by pain management in the past Dr Janelle Borjas\par Patient attests to 6 weeks of provider directed treatment, including gabapentin, Cymbalta, and Lyrica.\par Has trialed viscosupplementation and intra-articular steroid injections, physical therapy\par Patient reports treatment that occurred within the last 3 months\par Patient reports that symptoms have been persistent and and progressing after treatment\par  \par Reports there is no present numbness, there is no weakness. Patient denies any bowel/bladder incontinence, no saddle/perineal anesthesia or any other red flag signs or symptoms. Reports BMs.\par

## 2022-09-26 NOTE — PHYSICAL EXAM
[de-identified] : Physical Exam (Telemedicine): \par \par Gen: AAOX3, NAD\par HEENT: PERRLA, EOMI, NCAT, NP\par Pulmonary: No Signs of Respiratory Distress\par MSK: AROM WFL, Limitations painful cervical extension\par Neurological: Grossly neurologically intact, Noted deficits none\par Gait: Normal, Non-antalgic, Sans AD\par Derm: No Rash, (-)Lesions, (-)Erythema, (-)Cyanosis \par Psych: Calm, Cooperative, Conversational\par \par Disclaimer: This is a limited examination for the purposes of conducting a telemedicine visit during the COVID-19 pandemic. Physical exam maneuvers were modified as necessary to allow patient to self perform. A focused physician physical examination will be performed during in person visits and should be referred to to determine need for further testing, interventions or degree of disability.

## 2022-09-26 NOTE — DATA REVIEWED
[FreeTextEntry1] : PROCEDURE: XR KNEES BILAT (3VIEW) \par \par ORDER #: [id number] \par CC: ; ; ; \par End of cc's \par \par History: M17.0 OSTEOARTHRITIS OF KNEES. \par \par  Bilateral knee x-rays 9/19/2022 \par \par  AP, oblique and lateral views of both knees provided. \par \par  Right knee: \par \par  There is no acute cortical fracture or displacement. There is medial greater \par  than lateral tibiofemoral osteoarthritis without significant joint space \par  narrowing. There is mild patellofemoral arthritis. There is diffuse \par  osteopenia/osteoporosis. \par \par  Left knee: \par \par  There is no acute cortical fracture or displacement. There is medial greater \par  than lateral tibiofemoral osteoarthritis without significant joint space \par  narrowing. There is mild patellofemoral arthritis. There is diffuse \par  osteopenia/osteoporosis. \par \par  IMPRESSION: \par \par  No acute fracture or displacement. \par \par  Bilateral tibiofemoral osteoarthritis.

## 2022-09-26 NOTE — REVIEW OF SYSTEMS
[Back Pain] : back pain [Neck Pain] : neck pain [Muscle Pain] : muscle pain [Joint Pain] : joint pain [Joint Stiffness] : joint stiffness [Decreased ROM] : decreased range of motion [Negative] : Heme/Lymph

## 2022-09-29 ENCOUNTER — APPOINTMENT (OUTPATIENT)
Dept: PAIN MANAGEMENT | Facility: HOSPITAL | Age: 72
End: 2022-09-29

## 2022-10-17 ENCOUNTER — APPOINTMENT (OUTPATIENT)
Dept: RHEUMATOLOGY | Facility: CLINIC | Age: 72
End: 2022-10-17
Payer: MEDICARE

## 2022-10-26 ENCOUNTER — APPOINTMENT (OUTPATIENT)
Dept: RHEUMATOLOGY | Facility: CLINIC | Age: 72
End: 2022-10-26

## 2022-10-28 ENCOUNTER — APPOINTMENT (OUTPATIENT)
Dept: PAIN MANAGEMENT | Facility: CLINIC | Age: 72
End: 2022-10-28

## 2022-11-29 ENCOUNTER — APPOINTMENT (OUTPATIENT)
Dept: RHEUMATOLOGY | Facility: CLINIC | Age: 72
End: 2022-11-29

## 2022-12-06 ENCOUNTER — APPOINTMENT (OUTPATIENT)
Dept: UROLOGY | Facility: CLINIC | Age: 72
End: 2022-12-06

## 2022-12-14 ENCOUNTER — APPOINTMENT (OUTPATIENT)
Dept: PAIN MANAGEMENT | Facility: CLINIC | Age: 72
End: 2022-12-14

## 2022-12-14 DIAGNOSIS — M48.02 SPINAL STENOSIS, CERVICAL REGION: ICD-10-CM

## 2022-12-14 PROCEDURE — 99213 OFFICE O/P EST LOW 20 MIN: CPT | Mod: 95

## 2022-12-14 NOTE — HISTORY OF PRESENT ILLNESS
[Home] : at home, [unfilled] , at the time of the visit. [Medical Office: ([facility])___] : at the medical office located in  [Verbal consent obtained from patient] : the patient, [unfilled] [FreeTextEntry1] : Interval Note:\par \par Patient reports ongoing pain in the neck, trapezius, upper back that has been progressing.  She has not been able to arrange for the epidural steroid injection as she has not been able to secure a ride, in the setting that she wanted anesthesia.  She is interested in something that might be available in the office upon follow-up.\par \par Moving bowels regularly. No recent falls. \par \par Denies weakness, numbness. Patient denies any bowel/bladder incontinence, no saddle/perineal anesthesia or any other red flag signs or symptoms. \par \par --\par \par \par Trigger Point Injections -  Bilateral Cervical Trigger Point Injections 09/09/22\par \par \par \par \par --\par \par HPI - Ms. FRANDY DE GUZMAN is a 71 year F with PHx, chronic pain, severe degenerative arthritis of her knees and hands as below, referred by Leanne Hidalgo MD who presents today with chief complaint of LEFT sided neck pain. Reports that it developed many years ago. It i/s located neck spine;  there is radiation of the pain  into the occiput and left shoulder The pain is presently 8/10  in severity on the numerical rating scale. It is sharp in nature. The pain is constant. There is diurnal worsening, during the night The pain is aggravated with activity The pain improves with rest. The pain is functionally and emotionally disabling for the patient as its preventing them from going about activities of daily living, such as routine housework. The pain does impair the patient’s ability to sleep. \par \par Patient has been seen by pain management in the past Dr Janelle Borjas\par Patient attests to 6 weeks of provider directed treatment, including gabapentin, Cymbalta, and Lyrica.\par Has trialed viscosupplementation and intra-articular steroid injections, physical therapy\par Patient reports treatment that occurred within the last 3 months\par Patient reports that symptoms have been persistent and and progressing after treatment\par  \par Reports there is no present numbness, there is no weakness. Patient denies any bowel/bladder incontinence, no saddle/perineal anesthesia or any other red flag signs or symptoms. Reports BMs.\par

## 2022-12-14 NOTE — PHYSICAL EXAM
[de-identified] : Physical Exam (Telemedicine): \par \par Gen: AAOX3, NAD\par HEENT: PERRLA, EOMI, NCAT, NP\par Pulmonary: No Signs of Respiratory Distress\par MSK: AROM WFL, Limitations painful cervical extension / range of motion\par Neurological: Grossly neurologically intact, Noted deficits none\par Gait: Antalgic, Sans AD\par Derm: No Rash, (-)Lesions, (-)Erythema, (-)Cyanosis \par Psych: Calm, Cooperative, Conversational\par \par Disclaimer: This is a limited examination for the purposes of conducting a telemedicine visit during the COVID-19 pandemic. Physical exam maneuvers were modified as necessary to allow patient to self perform. A focused physician physical examination will be performed during in person visits and should be referred to to determine need for further testing, interventions or degree of disability.

## 2022-12-14 NOTE — ASSESSMENT
[FreeTextEntry1] : Ms. FRANDY DE GUZMAN is a 71 year F suffering from neck left shoulder pain, that based upon today's subjective complaints, physical examination, and MRI review, is likely secondary to cervical stenosis\par \par >> Medications\par \par Chronic opioid use for non-malignant pain, in particular at high doses would not be recommended since it can potentially lead to hyperalgesia (hypersensitivity), tolerance and addiction. \par  \par Cw Gabapentin 300 mg BID\par \par Robaxin 500 mg po qhs prn spasm\par \par \par >> Interventions\par \par Arrange for trigger point injections of the trapezius\par \par Viscosupplementation of bilateral knees (at [place]) - unable to drive there\par \par Consider trigger point injections of lumbar paraspinal muscles / quadratus musculature\par  \par Consider cervical interlaminar MARIAH at C7/T1  (needs a  bc wants conscious sedation)\par  \par \par  \par >> Therapy and Other Modalities\par   \par Continue with daily home stretching regimen\par \par \par >> Imaging and Other Studies\par \par See Media \par \par >> Consults\par \par None ordered \par

## 2022-12-14 NOTE — DATA REVIEWED
[FreeTextEntry1] : PROCEDURE: XR KNEES BILAT (3VIEW) \par \par ORDER #: [id number] \par CC: ; ; ; \par End of cc's \par \par History: M17.0 OSTEOARTHRITIS OF KNEES. \par \par  Bilateral knee x-rays 9/19/2022 \par \par  AP, oblique and lateral views of both knees provided. \par \par  Right knee: \par \par  There is no acute cortical fracture or displacement. There is medial greater \par  than lateral tibiofemoral osteoarthritis without significant joint space \par  narrowing. There is mild patellofemoral arthritis. There is diffuse \par  osteopenia/osteoporosis. \par \par  Left knee: \par \par  There is no acute cortical fracture or displacement. There is medial greater \par  than lateral tibiofemoral osteoarthritis without significant joint space \par  narrowing. There is mild patellofemoral arthritis. There is diffuse \par  osteopenia/osteoporosis. \par \par  IMPRESSION: \par \par  No acute fracture or displacement. \par \par  Bilateral tibiofemoral osteoarthritis.

## 2022-12-21 ENCOUNTER — TRANSCRIPTION ENCOUNTER (OUTPATIENT)
Age: 72
End: 2022-12-21

## 2023-01-04 ENCOUNTER — APPOINTMENT (OUTPATIENT)
Dept: PAIN MANAGEMENT | Facility: CLINIC | Age: 73
End: 2023-01-04
Payer: MEDICARE

## 2023-01-04 VITALS
HEIGHT: 65 IN | DIASTOLIC BLOOD PRESSURE: 64 MMHG | BODY MASS INDEX: 18.99 KG/M2 | SYSTOLIC BLOOD PRESSURE: 130 MMHG | HEART RATE: 65 BPM | WEIGHT: 114 LBS | OXYGEN SATURATION: 98 %

## 2023-01-04 DIAGNOSIS — M48.061 SPINAL STENOSIS, LUMBAR REGION WITHOUT NEUROGENIC CLAUDICATION: ICD-10-CM

## 2023-01-04 PROCEDURE — 20553 NJX 1/MLT TRIGGER POINTS 3/>: CPT

## 2023-01-04 PROCEDURE — 99213 OFFICE O/P EST LOW 20 MIN: CPT | Mod: 25

## 2023-01-04 NOTE — PHYSICAL EXAM
[de-identified] : General: Well-developed and well-nourished.  No acute distress.\par Psychiatric: Behavior was cooperative  \par Head:  Normocephalic and atraumatic\par Eyes:  Sclera white. Conjunctiva and eyelids pink and moist without discharge.\par Cardiovascular:  Regular\par Respiratory:  Trachea midline. Normal effort.\par No accessory muscle use with respiration\par Abdomen: Non distended, soft and nontender\par Skin:  No rashes, ulcers, or lesions appreciated.\par Neck: There is no pain with extension,     ROM wnl\par Back there is tenderness to palpation about the trapezius region from the upper thoracic to mid thoracic, bilateral\par Extremities: No edema \par Musculoskeletal: Moving all extremities freely \par Neuro: CN 2-12 Grossly intact\par Sensation to light touch is intact in all extremities\par Gait: Ambulates with no assistive device.

## 2023-01-04 NOTE — HISTORY OF PRESENT ILLNESS
[8] : a current pain level of 8/10 [FreeTextEntry1] : Interval Note:\par \par Patient presents today for evaluation for repeat trigger point injections given good relief in the past.  Reports ongoing pain between her shoulder blades, tension that progresses through the course of the day.  She denies any numbness or weakness.\par \par She also attests to persistent bilateral knee pain.\par \par Moving bowels regularly. No recent falls. \par \par Denies weakness, numbness. Patient denies any bowel/bladder incontinence, no saddle/perineal anesthesia or any other red flag signs or symptoms. \par \par --\par \par Trigger Point Injections -  Bilateral Cervical Trigger Point Injections 09/09/22\par \par --\par \par HPI - Ms. FRANDY DE GUZMAN is a 71 year F with PHx, chronic pain, severe degenerative arthritis of her knees and hands as below, referred by Leanne Hidalgo MD who presents today with chief complaint of LEFT sided neck pain. Reports that it developed many years ago. It i/s located neck spine;  there is radiation of the pain  into the occiput and left shoulder The pain is presently 8/10  in severity on the numerical rating scale. It is sharp in nature. The pain is constant. There is diurnal worsening, during the night The pain is aggravated with activity The pain improves with rest. The pain is functionally and emotionally disabling for the patient as its preventing them from going about activities of daily living, such as routine housework. The pain does impair the patient’s ability to sleep. \par \par Patient has been seen by pain management in the past Dr Janelle Borjas\par Patient attests to 6 weeks of provider directed treatment, including gabapentin, Cymbalta, and Lyrica.\par Has trialed viscosupplementation and intra-articular steroid injections, physical therapy\par Patient reports treatment that occurred within the last 3 months\par Patient reports that symptoms have been persistent and and progressing after treatment\par  \par Reports there is no present numbness, there is no weakness. Patient denies any bowel/bladder incontinence, no saddle/perineal anesthesia or any other red flag signs or symptoms. Reports BMs.\par   [Back Pain] : back pain

## 2023-01-04 NOTE — DATA REVIEWED
[FreeTextEntry1] : PROCEDURE: XR KNEES BILAT (3VIEW) \par \par ORDER #: [id number] \par CC: ; ; ; \par End of cc's \par \par History: M17.0 OSTEOARTHRITIS OF KNEES. \par \par  Bilateral knee x-rays 9/19/2022 \par \par  AP, oblique and lateral views of both knees provided. \par \par  Right knee: \par \par  There is no acute cortical fracture or displacement. There is medial greater \par  than lateral tibiofemoral osteoarthritis without significant joint space \par  narrowing. There is mild patellofemoral arthritis. There is diffuse \par  osteopenia/osteoporosis. \par \par  Left knee: \par \par  There is no acute cortical fracture or displacement. There is medial greater \par  than lateral tibiofemoral osteoarthritis without significant joint space \par  narrowing. There is mild patellofemoral arthritis. There is diffuse \par  osteopenia/osteoporosis. \par \par  IMPRESSION: \par \par  No acute fracture or displacement. \par \par  Bilateral tibiofemoral osteoarthritis.

## 2023-01-11 ENCOUNTER — TRANSCRIPTION ENCOUNTER (OUTPATIENT)
Age: 73
End: 2023-01-11

## 2023-02-10 ENCOUNTER — APPOINTMENT (OUTPATIENT)
Dept: PAIN MANAGEMENT | Facility: CLINIC | Age: 73
End: 2023-02-10
Payer: MEDICARE

## 2023-02-10 DIAGNOSIS — M25.561 PAIN IN RIGHT KNEE: ICD-10-CM

## 2023-02-10 DIAGNOSIS — M25.562 PAIN IN RIGHT KNEE: ICD-10-CM

## 2023-02-10 DIAGNOSIS — M54.2 CERVICALGIA: ICD-10-CM

## 2023-02-10 PROCEDURE — 99213 OFFICE O/P EST LOW 20 MIN: CPT | Mod: 95

## 2023-02-10 NOTE — DATA REVIEWED
[FreeTextEntry1] : \par PROCEDURE: XR KNEES BILAT (3VIEW) \par \par ORDER #: [id number] \par CC: ; ; ; \par End of cc's \par \par History: M17.0 OSTEOARTHRITIS OF KNEES. \par \par  Bilateral knee x-rays 9/19/2022 \par \par  AP, oblique and lateral views of both knees provided. \par \par  Right knee: \par \par  There is no acute cortical fracture or displacement. There is medial greater \par  than lateral tibiofemoral osteoarthritis without significant joint space \par  narrowing. There is mild patellofemoral arthritis. There is diffuse \par  osteopenia/osteoporosis. \par \par  Left knee: \par \par  There is no acute cortical fracture or displacement. There is medial greater \par  than lateral tibiofemoral osteoarthritis without significant joint space \par  narrowing. There is mild patellofemoral arthritis. There is diffuse \par  osteopenia/osteoporosis. \par \par  IMPRESSION: \par \par  No acute fracture or displacement. \par

## 2023-02-10 NOTE — ASSESSMENT
[FreeTextEntry1] : Ms. FRANDY DE GUZMAN is a 71 year F suffering from knee pain that is likely secondary to bilateral knee osteoarthritis\par \par >> Medications\par \par Chronic opioid use for non-malignant pain, in particular at high doses would not be recommended since it can potentially lead to hyperalgesia (hypersensitivity), tolerance and addiction. \par  \par Cw Gabapentin 300 mg BID\par \par Robaxin 500 mg po qhs prn spasm\par \par \par >> Interventions\par \par Arrange for Bilateral knee Viscosupplementation SYNVISC - ONE of bilateral knees (at [place])\par Success rate and possible complications discussed with patient in detail. \par \par Consider trigger point injections of lumbar paraspinal muscles / quadratus musculature\par  \par Consider cervical interlaminar MARIAH at C7/T1  (needs a  bc wants conscious sedation)\par  \par  \par >> Therapy and Other Modalities\par   \par Continue with daily home stretching regimen\par \par >> Imaging and Other Studies\par \par See Media \par \par >> Consults\par \par None ordered \par

## 2023-02-10 NOTE — HISTORY OF PRESENT ILLNESS
[Neck Pain] : neck pain [Home] : at home, [unfilled] , at the time of the visit. [Medical Office: ([facility])___] : at the medical office located in  [Verbal consent obtained from patient] : the patient, [unfilled] [FreeTextEntry1] : Interval Note:\par \par Since last visit the pain intensity has improved in her neck / shoulder blades however complaining of ongoing pain in her knees, worse in the AM worse with climbing stairs\par \par Inquiring about viscosupplementation as a means, as was discussed in the past\par \par Moving bowels regularly. No recent falls. \par \par Denies weakness, numbness. Patient denies any bowel/bladder incontinence, no saddle/perineal anesthesia or any other red flag signs or symptoms.\par \par \par --\par \par Bilateral TRAPEZIUS trigger Point Injections 01/04/2023 - 80% relief x 4 weeks\par \par Trigger Point Injections -  Bilateral Cervical Trigger Point Injections 09/09/22\par \par --\par \par HPI - Ms. FRANDY DE GUZMAN is a 71 year F with PHx, chronic pain, severe degenerative arthritis of her knees and hands as below, referred by Leanne Hidalgo MD who presents today with chief complaint of LEFT sided neck pain. Reports that it developed many years ago. It i/s located neck spine;  there is radiation of the pain  into the occiput and left shoulder The pain is presently 8/10  in severity on the numerical rating scale. It is sharp in nature. The pain is constant. There is diurnal worsening, during the night The pain is aggravated with activity The pain improves with rest. The pain is functionally and emotionally disabling for the patient as its preventing them from going about activities of daily living, such as routine housework. The pain does impair the patient’s ability to sleep. \par \par Patient has been seen by pain management in the past Dr Janelle Borjas\par Patient attests to 6 weeks of provider directed treatment, including gabapentin, Cymbalta, and Lyrica.\par Has trialed viscosupplementation and intra-articular steroid injections, physical therapy\par Patient reports treatment that occurred within the last 3 months\par Patient reports that symptoms have been persistent and and progressing after treatment\par  \par Reports there is no present numbness, there is no weakness. Patient denies any bowel/bladder incontinence, no saddle/perineal anesthesia or any other red flag signs or symptoms. Reports BMs.\par

## 2023-02-10 NOTE — REVIEW OF SYSTEMS
[Back Pain] : back pain [Neck Pain] : neck pain [Muscle Pain] : muscle pain [Joint Stiffness] : joint stiffness [Decreased ROM] : decreased range of motion

## 2023-02-10 NOTE — PHYSICAL EXAM
[de-identified] : Physical Exam (Telemedicine): \par \par Gen: AAOX3, NAD\par HEENT: PERRLA, EOMI, NCAT, NP\par Pulmonary: No Signs of Respiratory Distress\par MSK: AROM WFL, Limitations painful knee ROM\par Neurological: Grossly neurologically intact, Noted deficits none\par Gait: Normal, Non-antalgic, Sans AD\par Derm: No Rash, (-)Lesions, (-)Erythema, (-)Cyanosis \par Psych: Calm, Cooperative, Conversational\par \par Disclaimer: This is a limited examination for the purposes of conducting a telemedicine visit during the COVID-19 pandemic. Physical exam maneuvers were modified as necessary to allow patient to self perform. A focused physician physical examination will be performed during in person visits and should be referred to to determine need for further testing, interventions or degree of disability.

## 2023-02-22 ENCOUNTER — APPOINTMENT (OUTPATIENT)
Dept: PAIN MANAGEMENT | Facility: CLINIC | Age: 73
End: 2023-02-22

## 2023-03-02 ENCOUNTER — APPOINTMENT (OUTPATIENT)
Dept: UROLOGY | Facility: CLINIC | Age: 73
End: 2023-03-02
Payer: MEDICARE

## 2023-03-02 VITALS
WEIGHT: 114 LBS | DIASTOLIC BLOOD PRESSURE: 91 MMHG | SYSTOLIC BLOOD PRESSURE: 169 MMHG | BODY MASS INDEX: 18.99 KG/M2 | HEIGHT: 65 IN | HEART RATE: 64 BPM

## 2023-03-02 PROCEDURE — 99203 OFFICE O/P NEW LOW 30 MIN: CPT

## 2023-03-03 NOTE — PHYSICAL EXAM
[General Appearance - Well Developed] : well developed [Normal Appearance] : normal appearance [General Appearance - In No Acute Distress] : no acute distress [Respiration, Rhythm And Depth] : normal respiratory rhythm and effort [Abdomen Soft] : soft [Costovertebral Angle Tenderness] : no ~M costovertebral angle tenderness [Skin Color & Pigmentation] : normal skin color and pigmentation [] : no rash [Oriented To Time, Place, And Person] : oriented to person, place, and time

## 2023-03-03 NOTE — ASSESSMENT
[FreeTextEntry1] : Patient is a 72 year female who presents with worsening nocturia associated with pelvic pressure over the last 2-3 months.  Prior to this episode no  history or symptoms.\par no associated pain.  no associated hematuria, dysuria or incontinence.  no modifying factors.\par She takes in minimal bladder irritants and stops drinking 2-3 hours before going to bed.  She states when she voids at night she is voiding a large amount.\par PVR 0 ml\par \par A/P\par 1. nocturia\par 2. pelvic pressure \par will get voiding diary and cystoscopy to assess

## 2023-03-06 ENCOUNTER — NON-APPOINTMENT (OUTPATIENT)
Age: 73
End: 2023-03-06

## 2023-03-06 DIAGNOSIS — R39.9 UNSPECIFIED SYMPTOMS AND SIGNS INVOLVING THE GENITOURINARY SYSTEM: ICD-10-CM

## 2023-03-16 ENCOUNTER — NON-APPOINTMENT (OUTPATIENT)
Age: 73
End: 2023-03-16

## 2023-04-03 ENCOUNTER — APPOINTMENT (OUTPATIENT)
Dept: UROLOGY | Facility: CLINIC | Age: 73
End: 2023-04-03
Payer: MEDICARE

## 2023-04-03 VITALS — HEART RATE: 71 BPM | SYSTOLIC BLOOD PRESSURE: 126 MMHG | DIASTOLIC BLOOD PRESSURE: 73 MMHG

## 2023-04-03 DIAGNOSIS — R10.2 PELVIC AND PERINEAL PAIN: ICD-10-CM

## 2023-04-03 DIAGNOSIS — R35.1 NOCTURIA: ICD-10-CM

## 2023-04-03 DIAGNOSIS — N32.81 OVERACTIVE BLADDER: ICD-10-CM

## 2023-04-03 PROCEDURE — 52000 CYSTOURETHROSCOPY: CPT

## 2023-04-05 RX ORDER — MIRABEGRON 25 MG/1
25 TABLET, FILM COATED, EXTENDED RELEASE ORAL
Qty: 30 | Refills: 1 | Status: COMPLETED | COMMUNITY
Start: 2023-04-03 | End: 2023-04-05

## 2023-04-10 ENCOUNTER — APPOINTMENT (OUTPATIENT)
Dept: RHEUMATOLOGY | Facility: CLINIC | Age: 73
End: 2023-04-10
Payer: MEDICARE

## 2023-04-10 VITALS
BODY MASS INDEX: 17.39 KG/M2 | DIASTOLIC BLOOD PRESSURE: 70 MMHG | SYSTOLIC BLOOD PRESSURE: 126 MMHG | HEIGHT: 65 IN | WEIGHT: 104.38 LBS

## 2023-04-10 DIAGNOSIS — M47.816 SPONDYLOSIS W/OUT MYELOPATHY OR RADICULOPATHY, LUMBAR REGION: ICD-10-CM

## 2023-04-10 DIAGNOSIS — M79.7 FIBROMYALGIA: ICD-10-CM

## 2023-04-10 DIAGNOSIS — M62.838 OTHER MUSCLE SPASM: ICD-10-CM

## 2023-04-10 PROCEDURE — 99205 OFFICE O/P NEW HI 60 MIN: CPT

## 2023-04-10 RX ORDER — FOLIC ACID 1 MG/1
1 TABLET ORAL DAILY
Qty: 30 | Refills: 6 | Status: DISCONTINUED | COMMUNITY
Start: 2022-07-07 | End: 2023-04-10

## 2023-04-10 RX ORDER — GLUCOSAMINE HCL/CHONDROITIN SU 500-400 MG
3 CAPSULE ORAL
Qty: 3 | Refills: 0 | Status: DISCONTINUED | COMMUNITY
End: 2023-04-10

## 2023-04-10 RX ORDER — ESOMEPRAZOLE MAGNESIUM 20 MG/1
20 CAPSULE, DELAYED RELEASE ORAL
Refills: 0 | Status: DISCONTINUED | COMMUNITY
End: 2023-04-10

## 2023-04-10 NOTE — HISTORY OF PRESENT ILLNESS
[FreeTextEntry1] : 71yo W with PMHx DJD spine lumbar, C - cervical, osteoarthritis, scoliosis\par \par \par History:\par Chronic pain\par diffuse body aches\par upper and lower ext\par multiple medical evaluation in the past\par generalized pain\par no fever, no chills\par no description of synovitis or dactylitis\par no severe skin rashes reported\par no raynauds\par multiple tender areas reported\par \par Patient demands multiple studies to be done despite medical explanations that thoses studies are not necessary at this time. patient request multiple MRIs to be done and procedures.\par \par Psy/social\par [place]\par described that doesn’t feel comfortable\par described difficult situation with family member, daugther\par worked with a  in the past over this issues\par \par \par Osteoarthritis:\par diagnosed with osteoarthritis of the hands and knee\par hands with description of erosive changes\par knee MRI with OA in multiple compartments\par \par DJD spine\par scoliosis since young age\par MRIs with description of DJD changes\par on pain management follow up\par plan for cervical / lumbar epidural injections\par chronic DJD \par \par \par

## 2023-04-10 NOTE — PHYSICAL EXAM
[General Appearance - Alert] : alert [General Appearance - In No Acute Distress] : in no acute distress [Sclera] : the sclera and conjunctiva were normal [Auscultation Breath Sounds / Voice Sounds] : lungs were clear to auscultation bilaterally [Heart Rate And Rhythm] : heart rate was normal and rhythm regular [Heart Sounds] : normal S1 and S2 [FreeTextEntry1] : multiple areas of upper and lower body with ttp. hands: notable [place] and Adriane nodes [] : no rash [No Focal Deficits] : no focal deficits [Oriented To Time, Place, And Person] : oriented to person, place, and time

## 2023-04-11 LAB
ALBUMIN SERPL ELPH-MCNC: 4.6 G/DL
ALP BLD-CCNC: 96 U/L
ALT SERPL-CCNC: 12 U/L
ANION GAP SERPL CALC-SCNC: 15 MMOL/L
AST SERPL-CCNC: 23 U/L
BASOPHILS # BLD AUTO: 0.07 K/UL
BASOPHILS NFR BLD AUTO: 0.9 %
BILIRUB SERPL-MCNC: 0.5 MG/DL
BUN SERPL-MCNC: 19 MG/DL
CALCIUM SERPL-MCNC: 10.3 MG/DL
CHLORIDE SERPL-SCNC: 104 MMOL/L
CO2 SERPL-SCNC: 23 MMOL/L
CREAT SERPL-MCNC: 0.8 MG/DL
EGFR: 78 ML/MIN/1.73M2
EOSINOPHIL # BLD AUTO: 0.09 K/UL
EOSINOPHIL NFR BLD AUTO: 1.2 %
ESTIMATED AVERAGE GLUCOSE: 111 MG/DL
FOLATE SERPL-MCNC: 7.4 NG/ML
GLUCOSE SERPL-MCNC: 74 MG/DL
HBA1C MFR BLD HPLC: 5.5 %
HCT VFR BLD CALC: 41.4 %
HGB BLD-MCNC: 12.8 G/DL
IMM GRANULOCYTES NFR BLD AUTO: 0.3 %
LYMPHOCYTES # BLD AUTO: 1.89 K/UL
LYMPHOCYTES NFR BLD AUTO: 25.5 %
MAN DIFF?: NORMAL
MCHC RBC-ENTMCNC: 30.9 GM/DL
MCHC RBC-ENTMCNC: 30.9 PG
MCV RBC AUTO: 100 FL
MONOCYTES # BLD AUTO: 0.66 K/UL
MONOCYTES NFR BLD AUTO: 8.9 %
NEUTROPHILS # BLD AUTO: 4.67 K/UL
NEUTROPHILS NFR BLD AUTO: 63.2 %
PLATELET # BLD AUTO: 331 K/UL
POTASSIUM SERPL-SCNC: 4.3 MMOL/L
PROT SERPL-MCNC: 6.8 G/DL
RBC # BLD: 4.14 M/UL
RBC # FLD: 12.7 %
RHEUMATOID FACT SER QL: <10 IU/ML
SODIUM SERPL-SCNC: 142 MMOL/L
TSH SERPL-ACNC: 1.21 UIU/ML
URATE SERPL-MCNC: 5.4 MG/DL
VIT B12 SERPL-MCNC: 322 PG/ML
WBC # FLD AUTO: 7.4 K/UL

## 2023-04-12 ENCOUNTER — APPOINTMENT (OUTPATIENT)
Dept: PAIN MANAGEMENT | Facility: CLINIC | Age: 73
End: 2023-04-12
Payer: MEDICARE

## 2023-04-12 DIAGNOSIS — M79.10 MYALGIA, UNSPECIFIED SITE: ICD-10-CM

## 2023-04-12 PROCEDURE — 99213 OFFICE O/P EST LOW 20 MIN: CPT | Mod: 95

## 2023-04-12 RX ORDER — GABAPENTIN 300 MG/1
300 CAPSULE ORAL DAILY
Qty: 30 | Refills: 1 | Status: DISCONTINUED | COMMUNITY
Start: 2022-06-21 | End: 2023-04-12

## 2023-04-12 RX ORDER — GABAPENTIN 100 MG/1
100 CAPSULE ORAL
Qty: 90 | Refills: 2 | Status: ACTIVE | COMMUNITY
Start: 2023-04-12 | End: 1900-01-01

## 2023-04-12 NOTE — PHYSICAL EXAM
[de-identified] : Physical Exam (Telemedicine): \par \par Gen: AAOX3, NAD\par HEENT: PERRLA, EOMI, NCAT, NP\par Pulmonary: No Signs of Respiratory Distress\par MSK: AROM WFL, Limitations painful knee ROM\par Neurological: Grossly neurologically intact, Noted deficits none\par Gait: Normal, Non-antalgic, Sans AD\par Derm: No Rash, (-)Lesions, (-)Erythema, (-)Cyanosis \par Psych: Calm, Cooperative, Conversational\par \par Disclaimer: This is a limited examination for the purposes of conducting a telemedicine visit during the COVID-19 pandemic. Physical exam maneuvers were modified as necessary to allow patient to self perform. A focused physician physical examination will be performed during in person visits and should be referred to to determine need for further testing, interventions or degree of disability.

## 2023-04-12 NOTE — HISTORY OF PRESENT ILLNESS
[Neck Pain] : neck pain [Joint Pain] : joint  [8] : a current pain level of 8/10 [FreeTextEntry1] : Interval Note:\par \par Reports recurrence of pain in neck / shoulder blades \par \par Also complaining of ongoing pain in her knees, and would like to have viscosupplementation\par \par Moving bowels regularly. No recent falls. \par \par Denies weakness, numbness. Patient denies any bowel/bladder incontinence, no saddle/perineal anesthesia or any other red flag signs or symptoms.\par \par \par --\par \par Bilateral TRAPEZIUS trigger Point Injections 01/04/2023 - 80% relief x 4 weeks\par \par Trigger Point Injections -  Bilateral Cervical Trigger Point Injections 09/09/22\par \par --\par \par HPI - Ms. FRANDY DE GUZMAN is a 71 year F with PHx, chronic pain, severe degenerative arthritis of her knees and hands as below, referred by Leanne Hidalgo MD who presents today with chief complaint of LEFT sided neck pain. Reports that it developed many years ago. It i/s located neck spine;  there is radiation of the pain  into the occiput and left shoulder The pain is presently 8/10  in severity on the numerical rating scale. It is sharp in nature. The pain is constant. There is diurnal worsening, during the night The pain is aggravated with activity The pain improves with rest. The pain is functionally and emotionally disabling for the patient as its preventing them from going about activities of daily living, such as routine housework. The pain does impair the patient’s ability to sleep. \par \par Patient has been seen by pain management in the past Dr Janelle Borjas\par Patient attests to 6 weeks of provider directed treatment, including gabapentin, Cymbalta, and Lyrica.\par Has trialed viscosupplementation and intra-articular steroid injections, physical therapy\par Patient reports treatment that occurred within the last 3 months\par Patient reports that symptoms have been persistent and and progressing after treatment\par  \par Reports there is no present numbness, there is no weakness. Patient denies any bowel/bladder incontinence, no saddle/perineal anesthesia or any other red flag signs or symptoms. Reports BMs.\par

## 2023-04-12 NOTE — ASSESSMENT
[FreeTextEntry1] : Ms. FRANDY DE GUZMAN is a 71 year F suffering from neck left shoulder pain, that based upon today's subjective complaints, physical examination, and MRI review, is likely secondary to cervical stenosis\par \par Knee pain likely secondary to osteoarthritis\par \par >> Medications\par \par Chronic opioid use for non-malignant pain, in particular at high doses would not be recommended since it can potentially lead to hyperalgesia (hypersensitivity), tolerance and addiction. \par  \par Decrease to Gabapentin 100 mg QHS only - script provided\par \par Reconsider Tizanidine 2 mg po qhs prn spasms - has medication\par \par >> Interventions\par \par Viscosupplementation of bilateral knees (at [facility]) - challenge with arranging transportation\par \par Consider trigger point injections of lumbar paraspinal muscles / quadratus musculature\par  \par Consider cervical interlaminar MARIAH at C7/T1  (needs a  bc wants conscious sedation)\par  \par  \par >> Therapy and Other Modalities\par   \par Continue with daily home stretching regimen\par \par >> Imaging and Other Studies\par \par See Media \par \par >> Consults\par \par None ordered \par

## 2023-04-13 PROBLEM — R10.2 PELVIC PRESSURE IN FEMALE: Status: ACTIVE | Noted: 2023-03-03

## 2023-04-13 PROBLEM — R35.1 NOCTURIA: Status: ACTIVE | Noted: 2023-03-03

## 2023-04-15 LAB
CCP AB SER IA-ACNC: <8 UNITS
RF+CCP IGG SER-IMP: NEGATIVE

## 2023-04-20 ENCOUNTER — APPOINTMENT (OUTPATIENT)
Dept: NEUROLOGY | Facility: CLINIC | Age: 73
End: 2023-04-20

## 2023-04-28 ENCOUNTER — APPOINTMENT (OUTPATIENT)
Dept: PAIN MANAGEMENT | Facility: CLINIC | Age: 73
End: 2023-04-28

## 2023-07-06 ENCOUNTER — APPOINTMENT (OUTPATIENT)
Dept: NEUROLOGY | Facility: CLINIC | Age: 73
End: 2023-07-06

## 2023-08-21 ENCOUNTER — NON-APPOINTMENT (OUTPATIENT)
Age: 73
End: 2023-08-21

## 2023-08-23 ENCOUNTER — APPOINTMENT (OUTPATIENT)
Dept: SURGERY | Facility: CLINIC | Age: 73
End: 2023-08-23
Payer: MEDICARE

## 2023-08-23 ENCOUNTER — RESULT REVIEW (OUTPATIENT)
Age: 73
End: 2023-08-23

## 2023-08-23 VITALS — DIASTOLIC BLOOD PRESSURE: 67 MMHG | TEMPERATURE: 97.5 F | SYSTOLIC BLOOD PRESSURE: 110 MMHG | HEART RATE: 73 BPM

## 2023-08-23 DIAGNOSIS — R10.84 GENERALIZED ABDOMINAL PAIN: ICD-10-CM

## 2023-08-23 PROCEDURE — 99204 OFFICE O/P NEW MOD 45 MIN: CPT

## 2023-08-23 RX ORDER — PHENAZOPYRIDINE 200 MG/1
200 TABLET, FILM COATED ORAL
Qty: 20 | Refills: 0 | Status: DISCONTINUED | COMMUNITY
Start: 2023-03-06 | End: 2023-08-23

## 2023-08-23 RX ORDER — OXYBUTYNIN CHLORIDE 10 MG/1
10 TABLET, EXTENDED RELEASE ORAL
Qty: 30 | Refills: 1 | Status: DISCONTINUED | COMMUNITY
Start: 2023-04-04 | End: 2023-08-23

## 2023-08-23 NOTE — ASSESSMENT
[FreeTextEntry1] : 72 yr old anxious female appears in moderate distress due to abdominal discomfort.  Possibly due to constipation, or chronic ischemia as she seems to have pain out of proportion to exam.   Pt is quite cachetic but denies any changes in her weight recently.

## 2023-08-23 NOTE — CONSULT LETTER
[Dear  ___] : Dear  [unfilled], [Consult Letter:] : I had the pleasure of evaluating your patient, [unfilled]. [Please see my note below.] : Please see my note below. [Consult Closing:] : Thank you very much for allowing me to participate in the care of this patient.  If you have any questions, please do not hesitate to contact me. [Sincerely,] : Sincerely, [FreeTextEntry3] : Mami Smith MD FACS Director, [facility] Division of General Surgery  Acute Care Surgery [facility]  Office phone: [phone] Cell phone:  [phone] email:  [email or web address]

## 2023-08-23 NOTE — HISTORY OF PRESENT ILLNESS
[de-identified] : 72 yr old female referred by Dr. Orlando for possible inguinal hernia.  Pt appears to be in discomfort, stating she has a headache, her teeth hurt, she is constipated (last bm was last evening) and diffuse abdominal pain.  She feels that the straining she does for her bowel movements have caused her to have a hernia.  She does not see a bulge.  She has nausea associated with the pain.  She denies any recent weight loss, but says she has been avoiding eating given the constipation.  She sees Dr. Orlando from GI who recommended occasional use of milk of magnesium and Miralax.  the patient has not followed these instructions however.  She does not know when her last colonoscopy was.  She had spoken to me 2 days ago trhough the answering service on the weekend and I had advised her to be evaluated in the ER.  She refused saying she did not want to pay the bill.

## 2023-08-23 NOTE — PHYSICAL EXAM
[Respiratory Effort] : normal respiratory effort [No Rash or Lesion] : No rash or lesion [Alert] : alert [Oriented to Person] : oriented to person [Oriented to Place] : oriented to place [Anxious] : anxious [de-identified] : Cachetic appearing woman in moderate distress [de-identified] : muscle wasting [de-identified] : soft, nondistended, no abdominal wall hernias or inguinal hernias palpated supine or upright, nontender to palpation. No rebound or guarding

## 2023-08-28 ENCOUNTER — NON-APPOINTMENT (OUTPATIENT)
Age: 73
End: 2023-08-28

## 2023-08-30 ENCOUNTER — NON-APPOINTMENT (OUTPATIENT)
Age: 73
End: 2023-08-30

## 2023-10-16 ENCOUNTER — APPOINTMENT (OUTPATIENT)
Dept: NEUROLOGY | Facility: CLINIC | Age: 73
End: 2023-10-16
Payer: MEDICARE

## 2023-10-16 VITALS
BODY MASS INDEX: 18.49 KG/M2 | WEIGHT: 111 LBS | DIASTOLIC BLOOD PRESSURE: 74 MMHG | HEIGHT: 65 IN | HEART RATE: 90 BPM | TEMPERATURE: 98.1 F | SYSTOLIC BLOOD PRESSURE: 149 MMHG | OXYGEN SATURATION: 97 %

## 2023-10-16 DIAGNOSIS — R41.3 OTHER AMNESIA: ICD-10-CM

## 2023-10-16 PROCEDURE — 99214 OFFICE O/P EST MOD 30 MIN: CPT

## 2023-10-24 ENCOUNTER — APPOINTMENT (OUTPATIENT)
Dept: PSYCHIATRY | Facility: CLINIC | Age: 73
End: 2023-10-24
Payer: MEDICARE

## 2023-10-24 DIAGNOSIS — F43.20 ADJUSTMENT DISORDER, UNSPECIFIED: ICD-10-CM

## 2023-10-24 DIAGNOSIS — G31.84 MILD COGNITIVE IMPAIRMENT, SO STATED: ICD-10-CM

## 2023-10-24 DIAGNOSIS — F32.1 MAJOR DEPRESSIVE DISORDER, SINGLE EPISODE, MODERATE: ICD-10-CM

## 2023-10-24 PROCEDURE — 90791 PSYCH DIAGNOSTIC EVALUATION: CPT

## 2023-10-31 ENCOUNTER — APPOINTMENT (OUTPATIENT)
Dept: PAIN MANAGEMENT | Facility: CLINIC | Age: 73
End: 2023-10-31
Payer: MEDICARE

## 2023-10-31 ENCOUNTER — APPOINTMENT (OUTPATIENT)
Dept: RHEUMATOLOGY | Facility: CLINIC | Age: 73
End: 2023-10-31

## 2023-10-31 VITALS
HEART RATE: 67 BPM | WEIGHT: 111 LBS | DIASTOLIC BLOOD PRESSURE: 60 MMHG | SYSTOLIC BLOOD PRESSURE: 129 MMHG | BODY MASS INDEX: 18.49 KG/M2 | OXYGEN SATURATION: 98 % | HEIGHT: 65 IN

## 2023-10-31 PROCEDURE — 99214 OFFICE O/P EST MOD 30 MIN: CPT | Mod: 25

## 2023-10-31 PROCEDURE — 20611 DRAIN/INJ JOINT/BURSA W/US: CPT | Mod: 50

## 2023-11-20 ENCOUNTER — APPOINTMENT (OUTPATIENT)
Dept: PAIN MANAGEMENT | Facility: CLINIC | Age: 73
End: 2023-11-20
Payer: MEDICARE

## 2023-11-20 ENCOUNTER — APPOINTMENT (OUTPATIENT)
Dept: PAIN MANAGEMENT | Facility: CLINIC | Age: 73
End: 2023-11-20

## 2023-11-20 DIAGNOSIS — M17.0 BILATERAL PRIMARY OSTEOARTHRITIS OF KNEE: ICD-10-CM

## 2023-11-20 PROCEDURE — 99213 OFFICE O/P EST LOW 20 MIN: CPT | Mod: 95

## 2023-12-04 ENCOUNTER — APPOINTMENT (OUTPATIENT)
Dept: UROLOGY | Facility: CLINIC | Age: 73
End: 2023-12-04
Payer: MEDICARE

## 2023-12-04 PROCEDURE — 99441: CPT | Mod: 95

## 2023-12-04 RX ORDER — OXYBUTYNIN CHLORIDE 10 MG/1
10 TABLET, EXTENDED RELEASE ORAL
Qty: 90 | Refills: 0 | Status: ACTIVE | COMMUNITY
Start: 2023-12-04 | End: 1900-01-01

## 2023-12-05 ENCOUNTER — APPOINTMENT (OUTPATIENT)
Dept: PAIN MANAGEMENT | Facility: HOSPITAL | Age: 73
End: 2023-12-05

## 2023-12-05 ENCOUNTER — TRANSCRIPTION ENCOUNTER (OUTPATIENT)
Age: 73
End: 2023-12-05

## 2023-12-19 ENCOUNTER — APPOINTMENT (OUTPATIENT)
Dept: PAIN MANAGEMENT | Facility: CLINIC | Age: 73
End: 2023-12-19
Payer: MEDICARE

## 2023-12-19 DIAGNOSIS — M54.16 RADICULOPATHY, LUMBAR REGION: ICD-10-CM

## 2023-12-19 PROCEDURE — 99213 OFFICE O/P EST LOW 20 MIN: CPT | Mod: 95

## 2023-12-19 NOTE — ASSESSMENT
[FreeTextEntry1] : Ms. FRANDY DE GUZMAN is a 71 year F suffering from bilaterall knee pain that is likely secondary to knee ostroarthritis  Knee pain likely secondary to osteoarthritis  >> Medications  Chronic opioid use for non-malignant pain, in particular at high doses would not be recommended since it can potentially lead to hyperalgesia (hypersensitivity), tolerance and addiction.  Decrease to Gabapentin 100 mg QHS only - script provided  Reconsider Tizanidine 2 mg po qhs prn spasms - has medication  >> Interventions  Arrange for RIGHT  L4/5 interlaminar epidural steroid injection under fluoroscopic guidance. Success rate and possible complications discussed with patient in detail.   Consider trigger point injections of lumbar paraspinal muscles / quadratus musculature  Consider cervical interlaminar MARIAH at C7/T1 (needs a  bc wants conscious sedation)    >> Therapy and Other Modalities   Continue with daily home stretching regimen   >> Imaging and Other Studies   See Media   >> Consult  None ordered

## 2023-12-19 NOTE — DATA REVIEWED
[FreeTextEntry1] :  PROCEDURE: XR KNEES BILAT (3VIEW)   ORDER #: [id number]  CC: ; ; ;  End of cc's   History: M17.0 OSTEOARTHRITIS OF KNEES.    Bilateral knee x-rays 9/19/2022    AP, oblique and lateral views of both knees provided.    Right knee:    There is no acute cortical fracture or displacement. There is medial greater   than lateral tibiofemoral osteoarthritis without significant joint space   narrowing. There is mild patellofemoral arthritis. There is diffuse   osteopenia/osteoporosis.    Left knee:    There is no acute cortical fracture or displacement. There is medial greater   than lateral tibiofemoral osteoarthritis without significant joint space   narrowing. There is mild patellofemoral arthritis. There is diffuse   osteopenia/osteoporosis.    IMPRESSION:    No acute fracture or displacement.

## 2023-12-19 NOTE — REASON FOR VISIT
[Initial Visit] : an initial pain management visit [Home] : at home, [unfilled] , at the time of the visit. [Medical Office: ([facility])___] : at the medical office located in  [Patient] : the patient

## 2023-12-19 NOTE — PHYSICAL EXAM
[de-identified] : Physical Exam (Telemedicine):  Gen: AAOX3, NAD HEENT: PERRLA, EOMI, NCAT, NP Pulmonary: No Signs of Respiratory Distress MSK: AROM WFL, Limitations painful ROM Neurological: Grossly neurologically intact, Noted deficits none Gait: Normal, Non-antalgic, Sans AD Derm: No Rash, (-)Lesions, (-)Erythema, (-)Cyanosis  Psych: Calm, Cooperative, Conversational  Disclaimer: This is a limited examination for the purposes of conducting a telemedicine visit during the COVID-19 pandemic. Physical exam maneuvers were modified as necessary to allow patient to self perform. A focused physician physical examination will be performed during in person visits and should be referred to to determine need for further testing, interventions or degree of disability.

## 2023-12-19 NOTE — HISTORY OF PRESENT ILLNESS
[FreeTextEntry1] : Interval Note:  Low back pain into RIGHT hip and buttock  12/5/23 - Bilateral SYNVISC ONE Injections under US - Fair relief  Moving bowels regularly. No recent falls.   Denies weakness, numbness. Patient denies any bowel/bladder incontinence, no saddle/perineal anesthesia or any other red flag signs or symptoms.  --    10/31/2023 - Bilateral knee injections 80% relief however feels some of the relief dissipating  Bilateral TRAPEZIUS trigger Point Injections 01/04/2023 - 80% relief x 4 weeks  Trigger Point Injections -  Bilateral Cervical Trigger Point Injections 09/09/22  --  HPI - Ms. FRANDY DE GUZMAN is a 71 year F with PHx, chronic pain, severe degenerative arthritis of her knees and hands as below, referred by Leanne Hidalgo MD who presents today with chief complaint of LEFT sided neck pain. Reports that it developed many years ago. It i/s located neck spine;  there is radiation of the pain  into the occiput and left shoulder The pain is presently 8/10  in severity on the numerical rating scale. It is sharp in nature. The pain is constant. There is diurnal worsening, during the night The pain is aggravated with activity The pain improves with rest. The pain is functionally and emotionally disabling for the patient as its preventing them from going about activities of daily living, such as routine housework. The pain does impair the patient's ability to sleep.   Patient has been seen by pain management in the past Dr Janelle Borjas Patient attests to 6 weeks of provider directed treatment, including gabapentin, Cymbalta, and Lyrica. Has trialed viscosupplementation and intra-articular steroid injections, physical therapy Patient reports treatment that occurred within the last 3 months Patient reports that symptoms have been persistent and and progressing after treatment   Reports there is no present numbness, there is no weakness. Patient denies any bowel/bladder incontinence, no saddle/perineal anesthesia or any other red flag signs or symptoms. Reports BMs.

## 2024-01-02 ENCOUNTER — APPOINTMENT (OUTPATIENT)
Dept: PSYCHIATRY | Facility: CLINIC | Age: 74
End: 2024-01-02

## 2024-01-09 ENCOUNTER — APPOINTMENT (OUTPATIENT)
Dept: PAIN MANAGEMENT | Facility: CLINIC | Age: 74
End: 2024-01-09

## 2024-01-09 ENCOUNTER — APPOINTMENT (OUTPATIENT)
Dept: PSYCHIATRY | Facility: CLINIC | Age: 74
End: 2024-01-09

## 2024-04-19 ENCOUNTER — RX RENEWAL (OUTPATIENT)
Age: 74
End: 2024-04-19

## 2024-04-19 RX ORDER — GABAPENTIN 100 MG/1
100 CAPSULE ORAL
Qty: 90 | Refills: 2 | Status: ACTIVE | COMMUNITY
Start: 2023-10-16 | End: 1900-01-01

## 2024-04-22 ENCOUNTER — APPOINTMENT (OUTPATIENT)
Dept: PAIN MANAGEMENT | Facility: CLINIC | Age: 74
End: 2024-04-22

## 2024-05-01 ENCOUNTER — RX RENEWAL (OUTPATIENT)
Age: 74
End: 2024-05-01

## 2024-05-02 ENCOUNTER — RX RENEWAL (OUTPATIENT)
Age: 74
End: 2024-05-02

## 2024-05-02 RX ORDER — OXYBUTYNIN CHLORIDE 10 MG/1
10 TABLET, EXTENDED RELEASE ORAL
Qty: 90 | Refills: 0 | Status: ACTIVE | COMMUNITY
Start: 2024-02-22 | End: 1900-01-01

## 2024-05-08 ENCOUNTER — APPOINTMENT (OUTPATIENT)
Dept: UROLOGY | Facility: CLINIC | Age: 74
End: 2024-05-08

## 2024-06-19 ENCOUNTER — APPOINTMENT (OUTPATIENT)
Dept: PAIN MANAGEMENT | Facility: CLINIC | Age: 74
End: 2024-06-19

## 2024-08-06 ENCOUNTER — RX RENEWAL (OUTPATIENT)
Age: 74
End: 2024-08-06

## 2024-09-04 ENCOUNTER — RX RENEWAL (OUTPATIENT)
Age: 74
End: 2024-09-04

## 2024-09-11 ENCOUNTER — APPOINTMENT (OUTPATIENT)
Dept: PAIN MANAGEMENT | Facility: CLINIC | Age: 74
End: 2024-09-11
Payer: MEDICARE

## 2024-09-11 DIAGNOSIS — M48.061 SPINAL STENOSIS, LUMBAR REGION WITHOUT NEUROGENIC CLAUDICATION: ICD-10-CM

## 2024-09-11 PROCEDURE — G2211 COMPLEX E/M VISIT ADD ON: CPT

## 2024-09-11 PROCEDURE — 99212 OFFICE O/P EST SF 10 MIN: CPT

## 2024-09-11 NOTE — HISTORY OF PRESENT ILLNESS
[FreeTextEntry1] : Interval Note:  Persistent pain in the legs - expressing frustration  Gabapentin has been helpful  Moving bowels regularly. No recent falls.   Denies weakness, numbness. Patient denies any bowel/bladder incontinence, no saddle/perineal anesthesia or any other red flag signs or symptoms.  -- 12/5/23 - Bilateral SYNVISC ONE Injections under US - Fair relief  10/31/2023 - Bilateral knee injections 80% relief however feels some of the relief dissipating  Bilateral TRAPEZIUS trigger Point Injections 01/04/2023 - 80% relief x 4 weeks  Trigger Point Injections -  Bilateral Cervical Trigger Point Injections 09/09/22  --  HPI - Ms. FRANDY DE GUZMAN is a 71 year F with PHx, chronic pain, severe degenerative arthritis of her knees and hands as below, referred by Leanne Hidalgo MD who presents today with chief complaint of LEFT sided neck pain. Reports that it developed many years ago. It i/s located neck spine;  there is radiation of the pain  into the occiput and left shoulder The pain is presently 8/10  in severity on the numerical rating scale. It is sharp in nature. The pain is constant. There is diurnal worsening, during the night The pain is aggravated with activity The pain improves with rest. The pain is functionally and emotionally disabling for the patient as its preventing them from going about activities of daily living, such as routine housework. The pain does impair the patient's ability to sleep.   Patient has been seen by pain management in the past Dr Janelle Borjas Patient attests to 6 weeks of provider directed treatment, including gabapentin, Cymbalta, and Lyrica. Has trialed viscosupplementation and intra-articular steroid injections, physical therapy Patient reports treatment that occurred within the last 3 months Patient reports that symptoms have been persistent and and progressing after treatment   Reports there is no present numbness, there is no weakness. Patient denies any bowel/bladder incontinence, no saddle/perineal anesthesia or any other red flag signs or symptoms. Reports BMs.   [6] : a current pain level of 6/10

## 2024-09-11 NOTE — PHYSICAL EXAM
[de-identified] : Physical Exam (Telemedicine):  Gen: AAOX3, NAD HEENT: PERRLA, EOMI, NCAT, NP Pulmonary: No Signs of Respiratory Distress MSK: AROM WFL, Limitations painful ROM Neurological: Grossly neurologically intact, Noted deficits none Gait: Normal, Non-antalgic, Sans AD Derm: No Rash, (-)Lesions, (-)Erythema, (-)Cyanosis  Psych: Calm, Cooperative, Conversational  Disclaimer: This is a limited examination for the purposes of conducting a telemedicine visit during the COVID-19 pandemic. Physical exam maneuvers were modified as necessary to allow patient to self perform. A focused physician physical examination will be performed during in person visits and should be referred to to determine need for further testing, interventions or degree of disability.

## 2024-09-11 NOTE — ASSESSMENT
[FreeTextEntry1] : Ms. FRANDY DE GUZMAN is a 71 year F suffering from bilaterall knee pain that is likely secondary to knee ostroarthritis  Knee pain likely secondary to osteoarthritis  >> Medications  Chronic opioid use for non-malignant pain, in particular at high doses would not be recommended since it can potentially lead to hyperalgesia (hypersensitivity), tolerance and addiction.  Decrease to Gabapentin 100 mg QHS only - script provided  Reconsider Tizanidine 2 mg po qhs prn spasms - has medication  >> Interventions  Arrange for bilateral knee injections with SYNVISC under ultrasound - has transportation  Consider trigger point injections of lumbar paraspinal muscles / quadratus musculature  Consider cervical interlaminar MARIAH at C7/T1 (needs a  bc wants conscious sedation)    >> Therapy and Other Modalities   Continue with daily home stretching regimen   >> Imaging and Other Studies   See Media   >> Consult  None ordered

## 2024-09-11 NOTE — REASON FOR VISIT
[Home] : at home, [unfilled] , at the time of the visit. [Medical Office: ([facility])___] : at the medical office located in  [Patient] : the patient [Follow-Up Visit] : a follow-up pain management visit

## 2024-12-08 NOTE — PLAN
Patient is here for a routine follow up fo Globus sensation. Last OV was 3 months ago Planned to have a EGD to evaluate UGI pathology but was advised to have evaluation by neurologist and cardiologist due to history of stroke  in 2015/2016 and history of CAD.  Current symptoms: ?   ______________________________________________________________________________ Onset of symptoms: Since her stroke 10/2015 and 01/2016, worse in ~ 6 months Descriptions: globulus sensation in the esophagus Patient reports she drinks liquids which helps relief sensation  Associated symptoms: belching and hiccups Patient also reports intermittent mild to moderate epigastric pain  Medications tried: Patient was previously on Pantoprazole and Omeprazole for several years Also tried Pepcid but hasn't taken PPI or H2RB for about a year  [facility]uations: [facility] [FreeTextEntry1] : I again advised the pateint to go to the ER for more immediate evaluation, but she refused.  I will order a CT scan abd/pelvis with oral contrast for pt to schedule at her convenience.  I re-iterated that it may take up to a week or more to schedule outpatient and that the ER was better for urgent/emergent issues.  She prefers to schedule and have her aide take her.  Once I have the results I will call her to discuss.  She also will need to f/u w Dr. Orlando for colonoscopy.  08-Dec-2024 23:19

## 2025-04-02 NOTE — ASSESSMENT
[FreeTextEntry1] : Ms. FRANDY DE GUZMAN is a 71 year F suffering from neck left shoulder pain, that based upon today's subjective complaints, physical examination, and MRI review, is likely secondary to cervical stenosis\par \par >> Medications\par \par Chronic opioid use for non-malignant pain, in particular at high doses would not be recommended since it can potentially lead to hyperalgesia (hypersensitivity), tolerance and addiction. \par  \par Cw Gabapentin 300 mg BID\par \par Robaxin 500 mg po qhs prn spasm\par \par >> Interventions\par \par Trigger point injections bilateral cervical parspainsl muscles. See procedure note below\par \par CONSIDER Viscosupplementation of bilateral knees (at [facility]) \par  \par  \par >> Therapy and Other Modalities\par   \par Continue with daily home stretching regimen\par \par >> Imaging and Other Studies\par \par See Media \par \par >> Consults\par \par None ordered \par \par ---\par \par \par \par PROCEDURE NOTE\par \par PATIENT NAME: - FRANDY MCCORMACK \par \par \par ATTENDING: Nabeel Alfaro DO\par \par ASSISTANT: None\par \par PREPROCEDURE DIAGNOSIS: Myalgia\par \par POSTPROCEDURE DIAGNOSIS: Myalgia\par \par PROCEDURE PERFORMED: Trigger Point Injections - Number of Muscle Groups Bilateral TRAPEZIUS trigger Point Injections \par \par CONTRAINDICATIONS: None\par \par PROCEDURE IN DETAIL: I explained the details of the procedure to the patient including the risks, benefits and alternatives. We had an informed discussion and the patient verbalized understanding and signed the consent form. All questions were answered appropriately. \par \par The area with palpable trigger points was identified and included the Bilateral TRAPEZIUS musculature Trigger Point Injections. A 25 G 1 in needle was used to enter multiple trigger points identified. After negative aspiration, each trigger point was injected with a mixture of 1ml of Kenalog 40 mg/mL 4ml 0.25% Bupivacaine mixed with 5ml of 1% Lidocaine. A total of 10 ml was used. \par \par COMPLICATIONS: None\par \par BLOOD LOSS: None\par 								\par DISPOSITION:\par 1. Discharged home with instructions. \par 2. Follow up in the Pain Center in 2-4 weeks or on an as needed basis. \par \par   AAO x 3

## 2025-04-03 ENCOUNTER — APPOINTMENT (OUTPATIENT)
Dept: UROLOGY | Facility: CLINIC | Age: 75
End: 2025-04-03

## 2025-08-05 ENCOUNTER — NON-APPOINTMENT (OUTPATIENT)
Age: 75
End: 2025-08-05

## 2025-08-07 ENCOUNTER — APPOINTMENT (OUTPATIENT)
Dept: UROLOGY | Facility: CLINIC | Age: 75
End: 2025-08-07
Payer: MEDICARE

## 2025-08-07 VITALS — BODY MASS INDEX: 19.99 KG/M2 | WEIGHT: 120 LBS | HEIGHT: 65 IN

## 2025-08-07 DIAGNOSIS — N32.81 OVERACTIVE BLADDER: ICD-10-CM

## 2025-08-07 PROCEDURE — 99213 OFFICE O/P EST LOW 20 MIN: CPT

## 2025-08-07 RX ORDER — OLANZAPINE 20 MG/1
TABLET, ORALLY DISINTEGRATING ORAL
Refills: 0 | Status: ACTIVE | COMMUNITY

## 2025-08-07 RX ORDER — TRAZODONE HYDROCHLORIDE 300 MG/1
TABLET ORAL
Refills: 0 | Status: ACTIVE | COMMUNITY

## 2025-08-07 RX ORDER — MIRABEGRON 25 MG/1
25 TABLET, EXTENDED RELEASE ORAL DAILY
Qty: 90 | Refills: 0 | Status: ACTIVE | COMMUNITY
Start: 2025-08-07 | End: 1900-01-01

## 2025-08-07 RX ORDER — ATORVASTATIN CALCIUM 80 MG/1
TABLET, FILM COATED ORAL
Refills: 0 | Status: ACTIVE | COMMUNITY